# Patient Record
Sex: FEMALE | Race: WHITE | NOT HISPANIC OR LATINO | Employment: UNEMPLOYED | URBAN - METROPOLITAN AREA
[De-identification: names, ages, dates, MRNs, and addresses within clinical notes are randomized per-mention and may not be internally consistent; named-entity substitution may affect disease eponyms.]

---

## 2017-07-11 ENCOUNTER — OFFICE VISIT (OUTPATIENT)
Dept: CARDIOLOGY | Facility: CLINIC | Age: 57
End: 2017-07-11
Payer: COMMERCIAL

## 2017-07-11 VITALS
WEIGHT: 93.31 LBS | BODY MASS INDEX: 17.61 KG/M2 | HEART RATE: 72 BPM | SYSTOLIC BLOOD PRESSURE: 95 MMHG | HEIGHT: 61 IN | OXYGEN SATURATION: 99 % | DIASTOLIC BLOOD PRESSURE: 62 MMHG

## 2017-07-11 DIAGNOSIS — F41.9 ANXIETY: ICD-10-CM

## 2017-07-11 DIAGNOSIS — R06.09 DOE (DYSPNEA ON EXERTION): ICD-10-CM

## 2017-07-11 DIAGNOSIS — J44.9 CHRONIC OBSTRUCTIVE PULMONARY DISEASE, UNSPECIFIED COPD TYPE: ICD-10-CM

## 2017-07-11 DIAGNOSIS — R07.9 CHEST PAIN, UNSPECIFIED TYPE: ICD-10-CM

## 2017-07-11 DIAGNOSIS — R29.898 WEAKNESS OF BOTH LOWER EXTREMITIES: Primary | ICD-10-CM

## 2017-07-11 PROCEDURE — 99999 PR PBB SHADOW E&M-EST. PATIENT-LVL IV: CPT | Mod: PBBFAC,,, | Performed by: INTERNAL MEDICINE

## 2017-07-11 PROCEDURE — 99204 OFFICE O/P NEW MOD 45 MIN: CPT | Mod: S$GLB,,, | Performed by: INTERNAL MEDICINE

## 2017-07-11 RX ORDER — NITROGLYCERIN 0.4 MG/1
0.4 TABLET SUBLINGUAL EVERY 5 MIN PRN
COMMUNITY

## 2017-07-11 RX ORDER — CILOSTAZOL 50 MG/1
50 TABLET ORAL 2 TIMES DAILY
Qty: 60 TABLET | Refills: 11 | Status: SHIPPED | OUTPATIENT
Start: 2017-07-11 | End: 2017-10-06 | Stop reason: SDUPTHER

## 2017-07-11 RX ORDER — TIZANIDINE 4 MG/1
4 TABLET ORAL EVERY 6 HOURS PRN
Status: ON HOLD | COMMUNITY
End: 2017-08-26 | Stop reason: HOSPADM

## 2017-07-11 RX ORDER — MIRTAZAPINE 15 MG/1
15 TABLET, FILM COATED ORAL NIGHTLY
Status: ON HOLD | COMMUNITY
End: 2017-08-26 | Stop reason: HOSPADM

## 2017-07-11 RX ORDER — PRAVASTATIN SODIUM 20 MG/1
20 TABLET ORAL DAILY
Qty: 30 TABLET | Refills: 11 | Status: SHIPPED | OUTPATIENT
Start: 2017-07-11 | End: 2018-07-11

## 2017-07-11 RX ORDER — NAPROXEN SODIUM 220 MG/1
81 TABLET, FILM COATED ORAL DAILY
Refills: 0 | COMMUNITY
Start: 2017-07-11 | End: 2018-07-11

## 2017-07-11 NOTE — PROGRESS NOTES
"Subjective:    Patient ID:  Min Cerda is a 56 y.o. female who presents for evaluation of Extremity Weakness      HPI  55 y/o female with hx of COPD, active tobacco use, fam hx of early CAD who presents for evaluation for poor circulation. She states that she has been having bilateral LE weakness R>L for some time now which has worsened. She states her legs "give out" and get weak when walking about 10 steps. Also c/o bilateral calf pain when walking the same distance. Has had falls. Symptoms improve with rest. No non healing ulceration, however, lesions take a while to heal. Also has intermittent paresthesias of both feet. Has also been having intermittent substernal chest tightness without radiation and unrelated to exertion. Has needed nitro at times which improves pain. Has chronic EASLEY. Denies orthopnea, PND, syncope. Continues to smoke, but is trying to quit. Compliant with meds.     Review of Systems   Constitution: Positive for weakness. Negative for malaise/fatigue.   HENT: Negative for congestion and headaches.    Eyes: Negative for blurred vision.   Cardiovascular: Positive for claudication. Negative for chest pain, cyanosis, dyspnea on exertion, irregular heartbeat, leg swelling, near-syncope, orthopnea, palpitations, paroxysmal nocturnal dyspnea and syncope.   Respiratory: Negative for shortness of breath.    Endocrine: Negative for polyuria.   Hematologic/Lymphatic: Negative for bleeding problem.   Skin: Negative for itching and rash.   Musculoskeletal: Negative for joint swelling, muscle cramps and muscle weakness.   Gastrointestinal: Negative for abdominal pain, hematemesis, hematochezia, melena, nausea and vomiting.   Genitourinary: Negative for dysuria and hematuria.   Neurological: Negative for dizziness, focal weakness, light-headedness and loss of balance.   Psychiatric/Behavioral: Negative for depression. The patient is not nervous/anxious.         Objective:    Physical Exam "   Constitutional: She is oriented to person, place, and time. She appears well-developed and well-nourished.   HENT:   Head: Normocephalic and atraumatic.   Neck: Neck supple. No JVD present.   Cardiovascular: Normal rate, regular rhythm and normal heart sounds.    Pulses:       Carotid pulses are 2+ on the right side, and 2+ on the left side.       Radial pulses are 2+ on the right side, and 2+ on the left side.        Femoral pulses are 2+ on the right side, and 2+ on the left side.       Dorsalis pedis pulses are 2+ on the right side, and 2+ on the left side.        Posterior tibial pulses are 2+ on the right side, and 2+ on the left side.   Pulmonary/Chest: Effort normal and breath sounds normal.   Abdominal: Soft. Bowel sounds are normal.   Musculoskeletal: She exhibits no edema.   Neurological: She is alert and oriented to person, place, and time.   Skin: Skin is warm and dry.   Psychiatric: She has a normal mood and affect. Her behavior is normal. Thought content normal.         Assessment:       1. Weakness of both lower extremities    2. Chronic obstructive pulmonary disease, unspecified COPD type    3. Anxiety    4. EASLEY (dyspnea on exertion)    5. Chest pain, unspecified type      55 y/o female with hx and presentation as above. Has risk factors for CAD and will evaluate with noninvasive cardiac stress testing. Regarding LE weakness and pain, likely claudication from PAD and will start ASA/statin/cilostazol. Bilateral LE arterial doppler and discussed likely needing a peripheral angiogram after doppler findings.        Plan:       -Nuclear SPECT  -Arterial LE doppler  -f/u 3 months

## 2017-08-03 ENCOUNTER — HOSPITAL ENCOUNTER (OUTPATIENT)
Dept: RADIOLOGY | Facility: HOSPITAL | Age: 57
Discharge: HOME | End: 2017-08-03
Attending: INTERNAL MEDICINE
Payer: COMMERCIAL

## 2017-08-03 ENCOUNTER — HOSPITAL ENCOUNTER (OUTPATIENT)
Dept: CARDIOLOGY | Facility: HOSPITAL | Age: 57
Discharge: HOME | End: 2017-08-03
Attending: INTERNAL MEDICINE
Payer: COMMERCIAL

## 2017-08-03 DIAGNOSIS — R29.898 WEAKNESS OF BOTH LOWER EXTREMITIES: ICD-10-CM

## 2017-08-03 DIAGNOSIS — R07.9 CHEST PAIN, UNSPECIFIED TYPE: ICD-10-CM

## 2017-08-03 LAB — DIASTOLIC DYSFUNCTION: NO

## 2017-08-03 PROCEDURE — 93925 LOWER EXTREMITY STUDY: CPT | Mod: TC

## 2017-08-03 PROCEDURE — 78452 HT MUSCLE IMAGE SPECT MULT: CPT | Mod: TC

## 2017-08-03 PROCEDURE — 93016 CV STRESS TEST SUPVJ ONLY: CPT | Mod: ,,, | Performed by: INTERNAL MEDICINE

## 2017-08-03 PROCEDURE — 93018 CV STRESS TEST I&R ONLY: CPT | Mod: ,,, | Performed by: INTERNAL MEDICINE

## 2017-08-03 PROCEDURE — 93925 LOWER EXTREMITY STUDY: CPT | Mod: 26,,, | Performed by: RADIOLOGY

## 2017-08-03 PROCEDURE — 78452 HT MUSCLE IMAGE SPECT MULT: CPT | Mod: 26,,, | Performed by: RADIOLOGY

## 2017-08-04 ENCOUNTER — TELEPHONE (OUTPATIENT)
Dept: CARDIOLOGY | Facility: CLINIC | Age: 57
End: 2017-08-04

## 2017-08-04 DIAGNOSIS — I73.9 PAD (PERIPHERAL ARTERY DISEASE): Primary | ICD-10-CM

## 2017-08-11 ENCOUNTER — HOSPITAL ENCOUNTER (OUTPATIENT)
Dept: RADIOLOGY | Facility: HOSPITAL | Age: 57
Discharge: HOME | End: 2017-08-11
Attending: INTERNAL MEDICINE
Payer: COMMERCIAL

## 2017-08-11 ENCOUNTER — TELEPHONE (OUTPATIENT)
Dept: CARDIOLOGY | Facility: CLINIC | Age: 57
End: 2017-08-11

## 2017-08-11 DIAGNOSIS — I73.9 PAD (PERIPHERAL ARTERY DISEASE): ICD-10-CM

## 2017-08-11 NOTE — TELEPHONE ENCOUNTER
----- Message from Nii Foster MD sent at 8/11/2017  8:44 AM CDT -----  Contact: [phone]/ self   Doesn't work that way. Schedule her to see me in clinic  [place]    ----- Message -----  From: Nathaly Kelly MA  Sent: 8/10/2017   1:49 PM  To: Nii Foster MD        ----- Message -----  From: Melvi Rincon  Sent: 8/10/2017  11:10 AM  To: Cristian Bales Staff    Patient says she has been having to take her nitroglycerin 3 times a day instead of once a day. She would like to have a chest xray ordered so she can take it along with her CT scan. Please advise.

## 2017-08-18 ENCOUNTER — TELEPHONE (OUTPATIENT)
Dept: CARDIOLOGY | Facility: CLINIC | Age: 57
End: 2017-08-18

## 2017-08-18 NOTE — TELEPHONE ENCOUNTER
----- Message from Zayda Szymanski sent at 8/18/2017  3:13 PM CDT -----  Contact: [phone]/ self   Pt its requesting to schedule a procedure . Please advise

## 2017-08-22 ENCOUNTER — TELEPHONE (OUTPATIENT)
Dept: CARDIOLOGY | Facility: CLINIC | Age: 57
End: 2017-08-22

## 2017-08-22 RX ORDER — PREDNISONE 50 MG/1
50 TABLET ORAL
Qty: 3 TABLET | Refills: 0 | Status: SHIPPED | OUTPATIENT
Start: 2017-08-22 | End: 2017-08-22 | Stop reason: SDUPTHER

## 2017-08-22 RX ORDER — PREDNISONE 50 MG/1
50 TABLET ORAL
Qty: 3 TABLET | Refills: 0 | Status: SHIPPED | OUTPATIENT
Start: 2017-08-22 | End: 2017-09-01

## 2017-08-22 NOTE — TELEPHONE ENCOUNTER
----- Message from April Benoit sent at 8/22/2017  3:15 PM CDT -----  Contact: self  Pt is calling to speak with Dr Foster / His nurse concerning her being dizzy, can't stand up, short of breath and light headed. She said he aunt had these same symptoms before she ( the aunt ) had a massive heart attack.  Pt was advised to go to the nearest emergency room , pt then said she doesn't have a ride pt was told again to go to the emergency room because time is of the essence, I told to hang up and call 911 for a ride to the emergency room for treatment.

## 2017-08-25 ENCOUNTER — HOSPITAL ENCOUNTER (INPATIENT)
Facility: HOSPITAL | Age: 57
LOS: 1 days | Discharge: HOME | DRG: 683 | End: 2017-08-26
Attending: FAMILY MEDICINE | Admitting: INTERNAL MEDICINE
Payer: COMMERCIAL

## 2017-08-25 DIAGNOSIS — N17.9 ACUTE RENAL FAILURE, UNSPECIFIED ACUTE RENAL FAILURE TYPE: Primary | ICD-10-CM

## 2017-08-25 DIAGNOSIS — F32.A DEPRESSION, UNSPECIFIED DEPRESSION TYPE: ICD-10-CM

## 2017-08-25 DIAGNOSIS — I95.1 ORTHOSTATIC HYPOTENSION: ICD-10-CM

## 2017-08-25 DIAGNOSIS — R07.9 CHEST PAIN: ICD-10-CM

## 2017-08-25 DIAGNOSIS — E86.1 HYPOVOLEMIA: ICD-10-CM

## 2017-08-25 DIAGNOSIS — N17.9 ACUTE RENAL FAILURE: ICD-10-CM

## 2017-08-25 DIAGNOSIS — R63.4 RAPID WEIGHT LOSS: ICD-10-CM
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
## 2017-08-25 LAB
ALBUMIN SERPL BCP-MCNC: 3.8 G/DL
ALP SERPL-CCNC: 98 U/L
ALT SERPL W/O P-5'-P-CCNC: 23 U/L
ANION GAP SERPL CALC-SCNC: 10 MMOL/L
ANION GAP SERPL CALC-SCNC: 15 MMOL/L
AST SERPL-CCNC: 16 U/L
BASOPHILS # BLD AUTO: 0.04 K/UL
BASOPHILS NFR BLD: 0.5 %
BILIRUB SERPL-MCNC: 0.3 MG/DL
BILIRUB UR QL STRIP: NEGATIVE
BUN SERPL-MCNC: 53 MG/DL
BUN SERPL-MCNC: 62 MG/DL
CALCIUM SERPL-MCNC: 7.7 MG/DL
CALCIUM SERPL-MCNC: 8.7 MG/DL
CHLORIDE SERPL-SCNC: 108 MMOL/L
CHLORIDE SERPL-SCNC: 97 MMOL/L
CLARITY UR REFRACT.AUTO: ABNORMAL
CO2 SERPL-SCNC: 16 MMOL/L
CO2 SERPL-SCNC: 19 MMOL/L
COLOR UR AUTO: YELLOW
CREAT SERPL-MCNC: 2.3 MG/DL
CREAT SERPL-MCNC: 3.26 MG/DL
CREAT UR-MCNC: 32.3 MG/DL
DIFFERENTIAL METHOD: ABNORMAL
EOSINOPHIL # BLD AUTO: 0.2 K/UL
EOSINOPHIL NFR BLD: 2.3 %
ERYTHROCYTE [DISTWIDTH] IN BLOOD BY AUTOMATED COUNT: 14.6 %
EST. GFR  (AFRICAN AMERICAN): 17.3 ML/MIN/1.73 M^2
EST. GFR  (AFRICAN AMERICAN): 26 ML/MIN/1.73 M^2
EST. GFR  (NON AFRICAN AMERICAN): 15 ML/MIN/1.73 M^2
EST. GFR  (NON AFRICAN AMERICAN): 23 ML/MIN/1.73 M^2
FERRITIN SERPL-MCNC: 201 NG/ML
GLUCOSE SERPL-MCNC: 101 MG/DL
GLUCOSE SERPL-MCNC: 145 MG/DL
GLUCOSE UR QL STRIP: NEGATIVE
HCT VFR BLD AUTO: 33.8 %
HGB BLD-MCNC: 11.6 G/DL
HGB UR QL STRIP: ABNORMAL
IRON SERPL-MCNC: 46 UG/DL
KETONES UR QL STRIP: NEGATIVE
LEUKOCYTE ESTERASE UR QL STRIP: ABNORMAL
LYMPHOCYTES # BLD AUTO: 1.9 K/UL
LYMPHOCYTES NFR BLD: 21.6 %
MCH RBC QN AUTO: 32 PG
MCHC RBC AUTO-ENTMCNC: 34.3 G/DL
MCV RBC AUTO: 93 FL
MICROSCOPIC COMMENT: ABNORMAL
MONOCYTES # BLD AUTO: 0.5 K/UL
MONOCYTES NFR BLD: 6 %
NEUTROPHILS # BLD AUTO: 6 K/UL
NEUTROPHILS NFR BLD: 69.4 %
NITRITE UR QL STRIP: NEGATIVE
NT-PROBNP: 34 PG/ML
OB PNL STL: NEGATIVE
PH UR STRIP: 5 [PH] (ref 5–8)
PLATELET # BLD AUTO: 244 K/UL
PMV BLD AUTO: 10.5 FL
POTASSIUM SERPL-SCNC: 4.2 MMOL/L
POTASSIUM SERPL-SCNC: 4.3 MMOL/L
POTASSIUM UR-SCNC: <10 MMOL/L
PROT SERPL-MCNC: 6.4 G/DL
PROT UR QL STRIP: NEGATIVE
RBC # BLD AUTO: 3.62 M/UL
RBC #/AREA URNS AUTO: 4 /HPF (ref 0–4)
SATURATED IRON: 20 %
SODIUM SERPL-SCNC: 131 MMOL/L
SODIUM SERPL-SCNC: 134 MMOL/L
SODIUM UR-SCNC: 41 MMOL/L
SP GR UR STRIP: 1.01 (ref 1–1.03)
TOTAL IRON BINDING CAPACITY: 229 UG/DL
TRANSFERRIN SERPL-MCNC: 155 MG/DL
TRANSFERRIN SERPL-MCNC: 155 MG/DL
TROPONIN I SERPL DL<=0.01 NG/ML-MCNC: <0.006 NG/ML
TROPONIN I SERPL DL<=0.01 NG/ML-MCNC: <0.006 NG/ML
TROPONIN I SERPL DL<=0.01 NG/ML-MCNC: <0.012 NG/ML
URN SPEC COLLECT METH UR: ABNORMAL
UROBILINOGEN UR STRIP-ACNC: NEGATIVE EU/DL
WBC # BLD AUTO: 8.63 K/UL
WBC #/AREA URNS AUTO: 25 /HPF (ref 0–5)

## 2017-08-25 PROCEDURE — 25000003 PHARM REV CODE 250: Performed by: STUDENT IN AN ORGANIZED HEALTH CARE EDUCATION/TRAINING PROGRAM

## 2017-08-25 PROCEDURE — 87186 SC STD MICRODIL/AGAR DIL: CPT

## 2017-08-25 PROCEDURE — 81000 URINALYSIS NONAUTO W/SCOPE: CPT

## 2017-08-25 PROCEDURE — 99285 EMERGENCY DEPT VISIT HI MDM: CPT | Mod: 25

## 2017-08-25 PROCEDURE — 25000003 PHARM REV CODE 250: Performed by: FAMILY MEDICINE

## 2017-08-25 PROCEDURE — 82607 VITAMIN B-12: CPT

## 2017-08-25 PROCEDURE — 82272 OCCULT BLD FECES 1-3 TESTS: CPT

## 2017-08-25 PROCEDURE — 36415 COLL VENOUS BLD VENIPUNCTURE: CPT

## 2017-08-25 PROCEDURE — 82728 ASSAY OF FERRITIN: CPT

## 2017-08-25 PROCEDURE — 96361 HYDRATE IV INFUSION ADD-ON: CPT

## 2017-08-25 PROCEDURE — 87086 URINE CULTURE/COLONY COUNT: CPT

## 2017-08-25 PROCEDURE — 84484 ASSAY OF TROPONIN QUANT: CPT | Mod: 91

## 2017-08-25 PROCEDURE — 80048 BASIC METABOLIC PNL TOTAL CA: CPT

## 2017-08-25 PROCEDURE — 94761 N-INVAS EAR/PLS OXIMETRY MLT: CPT

## 2017-08-25 PROCEDURE — 82746 ASSAY OF FOLIC ACID SERUM: CPT

## 2017-08-25 PROCEDURE — 85025 COMPLETE CBC W/AUTO DIFF WBC: CPT

## 2017-08-25 PROCEDURE — 83540 ASSAY OF IRON: CPT

## 2017-08-25 PROCEDURE — 84300 ASSAY OF URINE SODIUM: CPT

## 2017-08-25 PROCEDURE — 93010 ELECTROCARDIOGRAM REPORT: CPT | Mod: ,,, | Performed by: INTERNAL MEDICINE

## 2017-08-25 PROCEDURE — 80053 COMPREHEN METABOLIC PANEL: CPT

## 2017-08-25 PROCEDURE — 93005 ELECTROCARDIOGRAM TRACING: CPT

## 2017-08-25 PROCEDURE — 87088 URINE BACTERIA CULTURE: CPT

## 2017-08-25 PROCEDURE — 84133 ASSAY OF URINE POTASSIUM: CPT

## 2017-08-25 PROCEDURE — 25000003 PHARM REV CODE 250: Performed by: INTERNAL MEDICINE

## 2017-08-25 PROCEDURE — 96360 HYDRATION IV INFUSION INIT: CPT

## 2017-08-25 PROCEDURE — 82570 ASSAY OF URINE CREATININE: CPT

## 2017-08-25 PROCEDURE — 83880 ASSAY OF NATRIURETIC PEPTIDE: CPT

## 2017-08-25 PROCEDURE — 87077 CULTURE AEROBIC IDENTIFY: CPT

## 2017-08-25 PROCEDURE — 11000001 HC ACUTE MED/SURG PRIVATE ROOM

## 2017-08-25 RX ORDER — METOPROLOL SUCCINATE 25 MG/1
25 TABLET, EXTENDED RELEASE ORAL DAILY
COMMUNITY
End: 2017-12-08

## 2017-08-25 RX ORDER — DIAZEPAM 5 MG/1
5 TABLET ORAL EVERY 12 HOURS PRN
Status: DISCONTINUED | OUTPATIENT
Start: 2017-08-25 | End: 2017-08-26 | Stop reason: HOSPADM

## 2017-08-25 RX ORDER — CILOSTAZOL 50 MG/1
50 TABLET ORAL 2 TIMES DAILY
Status: DISCONTINUED | OUTPATIENT
Start: 2017-08-25 | End: 2017-08-26 | Stop reason: HOSPADM

## 2017-08-25 RX ORDER — DOXEPIN HYDROCHLORIDE 25 MG/1
100 CAPSULE ORAL NIGHTLY
Status: DISCONTINUED | OUTPATIENT
Start: 2017-08-26 | End: 2017-08-26

## 2017-08-25 RX ORDER — PRAVASTATIN SODIUM 10 MG/1
20 TABLET ORAL DAILY
Status: DISCONTINUED | OUTPATIENT
Start: 2017-08-26 | End: 2017-08-26 | Stop reason: HOSPADM

## 2017-08-25 RX ORDER — METOPROLOL TARTRATE 25 MG/1
25 TABLET, FILM COATED ORAL 2 TIMES DAILY
Status: DISCONTINUED | OUTPATIENT
Start: 2017-08-25 | End: 2017-08-26 | Stop reason: HOSPADM

## 2017-08-25 RX ORDER — SODIUM CHLORIDE 9 MG/ML
1000 INJECTION, SOLUTION INTRAVENOUS
Status: COMPLETED | OUTPATIENT
Start: 2017-08-25 | End: 2017-08-25

## 2017-08-25 RX ORDER — HYDROCODONE BITARTRATE AND ACETAMINOPHEN 5; 325 MG/1; MG/1
2 TABLET ORAL EVERY 8 HOURS PRN
Status: DISCONTINUED | OUTPATIENT
Start: 2017-08-25 | End: 2017-08-26 | Stop reason: HOSPADM

## 2017-08-25 RX ORDER — SODIUM CHLORIDE 9 MG/ML
INJECTION, SOLUTION INTRAVENOUS CONTINUOUS
Status: DISCONTINUED | OUTPATIENT
Start: 2017-08-25 | End: 2017-08-25

## 2017-08-25 RX ORDER — NAPROXEN SODIUM 220 MG/1
81 TABLET, FILM COATED ORAL DAILY
Status: DISCONTINUED | OUTPATIENT
Start: 2017-08-26 | End: 2017-08-26 | Stop reason: HOSPADM

## 2017-08-25 RX ORDER — PROMETHAZINE HYDROCHLORIDE 12.5 MG/1
12.5 TABLET ORAL ONCE
Status: COMPLETED | OUTPATIENT
Start: 2017-08-26 | End: 2017-08-26

## 2017-08-25 RX ORDER — SODIUM CHLORIDE, SODIUM LACTATE, POTASSIUM CHLORIDE, CALCIUM CHLORIDE 600; 310; 30; 20 MG/100ML; MG/100ML; MG/100ML; MG/100ML
INJECTION, SOLUTION INTRAVENOUS CONTINUOUS
Status: DISCONTINUED | OUTPATIENT
Start: 2017-08-26 | End: 2017-08-26 | Stop reason: HOSPADM

## 2017-08-25 RX ADMIN — SODIUM CHLORIDE 1000 ML: 0.9 INJECTION, SOLUTION INTRAVENOUS at 02:08

## 2017-08-25 RX ADMIN — SODIUM CHLORIDE: 0.9 INJECTION, SOLUTION INTRAVENOUS at 04:08

## 2017-08-25 RX ADMIN — SODIUM CHLORIDE 1000 ML: 0.9 INJECTION, SOLUTION INTRAVENOUS at 12:08

## 2017-08-25 RX ADMIN — DIAZEPAM 5 MG: 5 TABLET ORAL at 10:08

## 2017-08-25 RX ADMIN — CILOSTAZOL 50 MG: 50 TABLET ORAL at 10:08

## 2017-08-25 RX ADMIN — METOPROLOL TARTRATE 25 MG: 25 TABLET ORAL at 10:08

## 2017-08-25 RX ADMIN — SODIUM CHLORIDE: 0.9 INJECTION, SOLUTION INTRAVENOUS at 10:08

## 2017-08-25 RX ADMIN — HYDROCODONE BITARTRATE AND ACETAMINOPHEN 2 TABLET: 5; 325 TABLET ORAL at 10:08

## 2017-08-25 NOTE — ED PROVIDER NOTES
Encounter Date: 2017       History     Chief Complaint   Patient presents with    Fall     I have been falling over the last couple weeks and my knees giving out and the top my body feels weak. Dr Wright sent me here because i lost 20 lbs over one month     56-year-old female presents with chief complaint of multiple falls and 20 pound weight loss over the last 2-3 months.  Patient does believe or weight loss been related to decreased appetite which she believes is related to the loss of her mother recently.  Patient states does have a history of COPD as well as anxiety.  Does complain of intermittent chest pain worse whenever she goes to lay down.  Denies any shortness of breath.  Denies any black or tarry stools.  Denies any bright red stools.  Patient does state has had colonoscopy in the past and was scheduled to have one in  did not.          Review of patient's allergies indicates:   Allergen Reactions    Crestor [rosuvastatin] Other (See Comments)     Causes tachycardia per patient    Iodine and iodide containing products     Tramadol     Sulfa (sulfonamide antibiotics) Palpitations     Past Medical History:   Diagnosis Date    Anxiety     Bipolar affective     COPD (chronic obstructive pulmonary disease)     Neck pain      Past Surgical History:   Procedure Laterality Date    APPENDECTOMY      CARPAL TUNNEL RELEASE       SECTION      HYSTERECTOMY      OVARIAN CYST REMOVAL      TONSILLECTOMY       History reviewed. No pertinent family history.  Social History   Substance Use Topics    Smoking status: Current Every Day Smoker     Packs/day: 1.00     Types: Cigarettes    Smokeless tobacco: Never Used    Alcohol use Not on file     Review of Systems   Respiratory: Negative for shortness of breath.    Cardiovascular: Positive for chest pain.   Musculoskeletal: Positive for back pain. Negative for neck pain.   All other systems reviewed and are negative.      Physical Exam      Initial Vitals [08/25/17 1147]   BP Pulse Resp Temp SpO2   (!) 89/51 68 15 96.6 °F (35.9 °C) 97 %      MAP       63.67         Physical Exam    Nursing note and vitals reviewed.  Constitutional: She appears well-developed and well-nourished.   HENT:   Head: Normocephalic and atraumatic.   Eyes: EOM are normal. Pupils are equal, round, and reactive to light.   Neck: Neck supple.   Cardiovascular: Normal rate, regular rhythm and normal heart sounds.   Pulmonary/Chest: Breath sounds normal.   Abdominal: Soft.   Musculoskeletal: Normal range of motion.        Right shoulder: She exhibits tenderness.        Arms:  Neurological: She is alert and oriented to person, place, and time.   Skin: Skin is warm. Capillary refill takes less than 2 seconds.   Psychiatric: She has a normal mood and affect. Her behavior is normal.         ED Course   Procedures  Labs Reviewed   CBC W/ AUTO DIFFERENTIAL - Abnormal; Notable for the following:        Result Value    RBC 3.62 (*)     Hemoglobin 11.6 (*)     Hematocrit 33.8 (*)     MCH 32.0 (*)     RDW 14.6 (*)     All other components within normal limits   OCCULT BLOOD X 1, STOOL   COMPREHENSIVE METABOLIC PANEL   TROPONIN I   NT-PRO NATRIURETIC PEPTIDE                               ED Course     Clinical Impression:   The primary encounter diagnosis was Acute renal failure, unspecified acute renal failure type. Diagnoses of Chest pain, Fall, Orthostatic hypotension, Hypovolemia, Acute renal failure, Depression, unspecified depression type, and Rapid weight loss were also pertinent to this visit.                           Will Fairchild MD  09/07/17 6117

## 2017-08-25 NOTE — PLAN OF CARE
Pt arrived via stretcher from [facility]. Pt AAOx3. Ambulated to bed with assistance. IV fluids at 100cc/hr. Up with assistance to bedside commode. Pt complains of generalized pain. Verbalized understanding of plan of care. Fall risk and allergy band on. Yellow socks on. Bed alarm set, bed in lowest position, call bell in reach. Will continue to monitor.

## 2017-08-25 NOTE — ED NOTES
"PT resting comfortably on stretcher AA&O X's 3.  Resp even and unlabored.  Skin warm and dry.  PT appears pale.  PT seems weak and slow to answer questions.  PT c/o weakness, weight loss, and N/V for the past 2 months.  PT has lost 20lbs in the past 2 months.  PT c/o frequent falls as well.  Pt fell yesterday.  PT with abrasion to RT wrist noted.  PT denies any LOC.  PT states, "my mom and son just  recently."  "I just thought I was depressed."   PT c/o decreased urine output.  PT c/o constipation frequently.    "

## 2017-08-26 VITALS
BODY MASS INDEX: 16.98 KG/M2 | HEIGHT: 61 IN | DIASTOLIC BLOOD PRESSURE: 58 MMHG | WEIGHT: 89.94 LBS | HEART RATE: 71 BPM | RESPIRATION RATE: 18 BRPM | OXYGEN SATURATION: 99 % | SYSTOLIC BLOOD PRESSURE: 101 MMHG | TEMPERATURE: 98 F

## 2017-08-26 LAB
ANION GAP SERPL CALC-SCNC: 7 MMOL/L
BASOPHILS # BLD AUTO: 0.02 K/UL
BASOPHILS NFR BLD: 0.2 %
BUN SERPL-MCNC: 33 MG/DL
CALCIUM SERPL-MCNC: 8.2 MG/DL
CHLORIDE SERPL-SCNC: 109 MMOL/L
CO2 SERPL-SCNC: 21 MMOL/L
CREAT SERPL-MCNC: 1.2 MG/DL
DIFFERENTIAL METHOD: ABNORMAL
EOSINOPHIL # BLD AUTO: 0.3 K/UL
EOSINOPHIL NFR BLD: 2.9 %
ERYTHROCYTE [DISTWIDTH] IN BLOOD BY AUTOMATED COUNT: 14.8 %
EST. GFR  (AFRICAN AMERICAN): 58 ML/MIN/1.73 M^2
EST. GFR  (NON AFRICAN AMERICAN): 50 ML/MIN/1.73 M^2
FOLATE SERPL-MCNC: 16.9 NG/ML
GLUCOSE SERPL-MCNC: 87 MG/DL
HCT VFR BLD AUTO: 28.9 %
HGB BLD-MCNC: 9.8 G/DL
LYMPHOCYTES # BLD AUTO: 3.9 K/UL
LYMPHOCYTES NFR BLD: 41.6 %
MCH RBC QN AUTO: 31.4 PG
MCHC RBC AUTO-ENTMCNC: 33.9 G/DL
MCV RBC AUTO: 93 FL
MONOCYTES # BLD AUTO: 0.6 K/UL
MONOCYTES NFR BLD: 6.3 %
NEUTROPHILS # BLD AUTO: 4.6 K/UL
NEUTROPHILS NFR BLD: 48.8 %
PLATELET # BLD AUTO: 213 K/UL
PMV BLD AUTO: 10.7 FL
POTASSIUM SERPL-SCNC: 4.4 MMOL/L
RBC # BLD AUTO: 3.12 M/UL
SODIUM SERPL-SCNC: 137 MMOL/L
TROPONIN I SERPL DL<=0.01 NG/ML-MCNC: <0.006 NG/ML
TSH SERPL DL<=0.005 MIU/L-ACNC: 2.1 UIU/ML
VIT B12 SERPL-MCNC: 433 PG/ML
WBC # BLD AUTO: 9.4 K/UL

## 2017-08-26 PROCEDURE — 84443 ASSAY THYROID STIM HORMONE: CPT

## 2017-08-26 PROCEDURE — 27000221 HC OXYGEN, UP TO 24 HOURS

## 2017-08-26 PROCEDURE — 25000003 PHARM REV CODE 250: Performed by: PSYCHIATRY & NEUROLOGY

## 2017-08-26 PROCEDURE — 25000003 PHARM REV CODE 250: Performed by: INTERNAL MEDICINE

## 2017-08-26 PROCEDURE — 93005 ELECTROCARDIOGRAM TRACING: CPT

## 2017-08-26 PROCEDURE — 84484 ASSAY OF TROPONIN QUANT: CPT

## 2017-08-26 PROCEDURE — 36415 COLL VENOUS BLD VENIPUNCTURE: CPT

## 2017-08-26 PROCEDURE — 80048 BASIC METABOLIC PNL TOTAL CA: CPT

## 2017-08-26 PROCEDURE — 25000003 PHARM REV CODE 250: Performed by: STUDENT IN AN ORGANIZED HEALTH CARE EDUCATION/TRAINING PROGRAM

## 2017-08-26 PROCEDURE — 94761 N-INVAS EAR/PLS OXIMETRY MLT: CPT

## 2017-08-26 PROCEDURE — 85025 COMPLETE CBC W/AUTO DIFF WBC: CPT

## 2017-08-26 RX ORDER — ESCITALOPRAM OXALATE 5 MG/1
5 TABLET ORAL DAILY
Status: DISCONTINUED | OUTPATIENT
Start: 2017-08-26 | End: 2017-08-26 | Stop reason: HOSPADM

## 2017-08-26 RX ORDER — DOXEPIN HYDROCHLORIDE 25 MG/1
100 CAPSULE ORAL NIGHTLY
Status: DISCONTINUED | OUTPATIENT
Start: 2017-08-26 | End: 2017-08-26 | Stop reason: HOSPADM

## 2017-08-26 RX ORDER — ESCITALOPRAM OXALATE 10 MG/1
10 TABLET ORAL DAILY
Qty: 30 TABLET | Refills: 1 | Status: SHIPPED | OUTPATIENT
Start: 2017-08-28 | End: 2017-10-06

## 2017-08-26 RX ORDER — ESCITALOPRAM OXALATE 10 MG/1
10 TABLET ORAL DAILY
Status: DISCONTINUED | OUTPATIENT
Start: 2017-08-28 | End: 2017-08-26 | Stop reason: HOSPADM

## 2017-08-26 RX ORDER — DOXEPIN HYDROCHLORIDE 100 MG/1
100 CAPSULE ORAL NIGHTLY
Start: 2017-08-26 | End: 2018-02-09

## 2017-08-26 RX ORDER — NITROGLYCERIN 0.4 MG/1
0.4 TABLET SUBLINGUAL EVERY 5 MIN PRN
Status: DISCONTINUED | OUTPATIENT
Start: 2017-08-26 | End: 2017-08-26 | Stop reason: HOSPADM

## 2017-08-26 RX ORDER — DIAZEPAM 5 MG/1
5 TABLET ORAL EVERY 12 HOURS PRN
Start: 2017-08-26

## 2017-08-26 RX ORDER — METOPROLOL TARTRATE 25 MG/1
25 TABLET, FILM COATED ORAL 2 TIMES DAILY
Qty: 60 TABLET | Refills: 1 | Status: CANCELLED | OUTPATIENT
Start: 2017-08-26 | End: 2018-08-26

## 2017-08-26 RX ADMIN — DIAZEPAM 5 MG: 5 TABLET ORAL at 10:08

## 2017-08-26 RX ADMIN — NITROGLYCERIN 0.4 MG: 0.4 TABLET SUBLINGUAL at 03:08

## 2017-08-26 RX ADMIN — SODIUM CHLORIDE, SODIUM LACTATE, POTASSIUM CHLORIDE, AND CALCIUM CHLORIDE: .6; .31; .03; .02 INJECTION, SOLUTION INTRAVENOUS at 08:08

## 2017-08-26 RX ADMIN — HYDROCODONE BITARTRATE AND ACETAMINOPHEN 2 TABLET: 5; 325 TABLET ORAL at 10:08

## 2017-08-26 RX ADMIN — CILOSTAZOL 50 MG: 50 TABLET ORAL at 08:08

## 2017-08-26 RX ADMIN — ASPIRIN 81 MG 81 MG: 81 TABLET ORAL at 08:08

## 2017-08-26 RX ADMIN — DOXEPIN HYDROCHLORIDE 100 MG: 25 CAPSULE ORAL at 12:08

## 2017-08-26 RX ADMIN — SODIUM CHLORIDE, SODIUM LACTATE, POTASSIUM CHLORIDE, AND CALCIUM CHLORIDE: .6; .31; .03; .02 INJECTION, SOLUTION INTRAVENOUS at 12:08

## 2017-08-26 RX ADMIN — PRAVASTATIN SODIUM 20 MG: 10 TABLET ORAL at 08:08

## 2017-08-26 RX ADMIN — PROMETHAZINE HYDROCHLORIDE 12.5 MG: 12.5 TABLET ORAL at 12:08

## 2017-08-26 RX ADMIN — ESCITALOPRAM OXALATE 5 MG: 5 TABLET ORAL at 11:08

## 2017-08-26 RX ADMIN — METOPROLOL TARTRATE 25 MG: 25 TABLET ORAL at 08:08

## 2017-08-26 NOTE — H&P
[facility] Internal Medicine H&P    Admitting Team: Hospital Medicine Team B  Attending Physician: Misty  Resident: Pari  Intern: Taco    Date of Admit: 2017    Chief Complaint     Weakness, decreased PO intake, nausea, depression, falls x 4 weeks    Subjective:      History of Present Illness:  Min Cerda is a 56 y.o. female with anxiety, depression, COPD, CAD, PAD who presents with four week history of poor PO intake, depressive symptoms, persistent nausea, and weakness associated with frequent falls.    The patient was in their usual state of health until four weeks ago when she began to experience depressive symptoms secondary to her mother's death and son's poor health. She began to eat less and less until where she was hardly eating at all. She reports that anytime she does eat she begins to experience nausea. She also reports that she has stopped drinking liquid as well. She has lost 20 lbs.  She reports a feeling of weakness in her muscles and that she frequently feels like she has lost her equilibrium and has had frequent falls.     She additionally reports altered sensation in her feet bilaterally and increased pain and swelling in her R foot. She also reports that they sometimes feel cold.     She denies cough, chest pain or SOB but continues to smoke. She denies fevers or chills.        Past Medical History:  Past Medical History:   Diagnosis Date    Anxiety     Bipolar affective     COPD (chronic obstructive pulmonary disease)     Neck pain        Past Surgical History:  Past Surgical History:   Procedure Laterality Date    APPENDECTOMY      CARPAL TUNNEL RELEASE       SECTION      HYSTERECTOMY      OVARIAN CYST REMOVAL      TONSILLECTOMY         Allergies:  Review of patient's allergies indicates:   Allergen Reactions    Crestor [rosuvastatin] Other (See Comments)     Causes tachycardia per patient    Iodine and iodide containing products     Tramadol     Sulfa  (sulfonamide antibiotics) Palpitations       Home Medications:  Prior to Admission medications    Medication Sig Start Date End Date Taking? Authorizing Provider   cilostazol (PLETAL) 50 MG Tab Take 1 tablet (50 mg total) by mouth 2 (two) times daily. 7/11/17 7/11/18 Yes Nii Foster MD   diazepam (VALIUM) 10 MG Tab Take 10 mg by mouth 3 (three) times daily.   Yes Historical Provider, MD   doxepin (SINEQUAN) 150 MG Cap Take 150 mg by mouth every evening.   Yes Historical Provider, MD   hydrocodone-acetaminophen 10-325mg (NORCO)  mg Tab Take by mouth every 4 to 6 hours as needed.   Yes Historical Provider, MD   meloxicam (MOBIC) 7.5 MG tablet Take 7.5 mg by mouth 2 (two) times daily.   Yes Historical Provider, MD   metoprolol succinate (TOPROL-XL) 25 MG 24 hr tablet Take 25 mg by mouth once daily.   Yes Historical Provider, MD   pravastatin (PRAVACHOL) 20 MG tablet Take 1 tablet (20 mg total) by mouth once daily. 7/11/17 7/11/18 Yes Nii Foster MD   albuterol (PROAIR HFA) 90 mcg/actuation inhaler Inhale 2 puffs into the lungs every 6 (six) hours as needed for Wheezing.    Historical Provider, MD   aspirin 81 MG Chew Take 1 tablet (81 mg total) by mouth once daily. 7/11/17 7/11/18  Nii Foster MD   cyproheptadine (PERIACTIN) 4 mg tablet Take 4 mg by mouth once daily.    Historical Provider, MD   fluticasone-salmeterol 250-50 mcg/dose (ADVAIR) 250-50 mcg/dose diskus inhaler Inhale 1 puff into the lungs 2 (two) times daily.    Historical Provider, MD   folic acid (FOLVITE) 1 MG tablet Take 1 mg by mouth once daily.    Historical Provider, MD   gabapentin (NEURONTIN) 300 MG capsule Take 300 mg by mouth 3 (three) times daily.    Historical Provider, MD   linaclotide (LINZESS) 290 mcg Cap Take 290 mcg by mouth once daily.    Historical Provider, MD   mirtazapine (REMERON) 15 MG tablet Take 15 mg by mouth every evening.    Historical Provider, MD   nitroGLYCERIN (NITROSTAT) 0.4 MG SL tablet Place 0.4  "mg under the tongue every 5 (five) minutes as needed for Chest pain.    Historical Provider, MD   predniSONE (DELTASONE) 50 MG Tab Take 1 tablet (50 mg total) by mouth as needed (for procedure). Take 1 tab 13 hours before, 7 hours before, 1 hour before procedure 17  Nii Foster MD   promethazine (PHENERGAN) 25 MG tablet Take 25 mg by mouth 3 (three) times daily as needed for Nausea.    Historical Provider, MD   tizanidine (ZANAFLEX) 4 MG tablet Take 4 mg by mouth every 6 (six) hours as needed.    Historical Provider, MD   topiramate (TOPAMAX) 50 MG tablet Take 100 mg by mouth 2 (two) times daily.    Historical Provider, MD       Family History:  History reviewed. No pertinent family history.    Social History:  Social History   Substance Use Topics    Smoking status: Current Every Day Smoker     Packs/day: 1.00     Types: Cigarettes    Smokeless tobacco: Never Used    Alcohol use Not on file       Review of Systems:  Pertinent items are noted in HPI. All other systems are reviewed and are negative.    Health Maintaince :   Primary Care Physician: Adriana    Immunizations:   TDap unknown  Flu unknown  Pna unknown    Cancer Screening:  PAP: unknown  MMG unknown  Colonoscopy unknown     Objective:   Last 24 Hour Vital Signs:  BP  Min: 70/45  Max: 124/58  Temp  Av.9 °F (36.1 °C)  Min: 96.2 °F (35.7 °C)  Max: 97.6 °F (36.4 °C)  Pulse  Av.4  Min: 66  Max: 87  Resp  Av.7  Min: 15  Max: 27  SpO2  Av.4 %  Min: 95 %  Max: 100 %  Height  Av' 1" (154.9 cm)  Min: 5' 1" (154.9 cm)  Max: 5' 1" (154.9 cm)  Weight  Av.8 kg (89 lb 14.8 oz)  Min: 40.8 kg (89 lb 14.4 oz)  Max: 40.8 kg (89 lb 15.2 oz)  Body mass index is 17 kg/m².  I/O last 3 completed shifts:  In: -   Out: 850 [Urine:850]    Physical Examination:  Gen: thin, withdrawn, cooperative  HEENT: PERRL, EOMi, dry mucous membranes  CVS: regular rate, regular rhythm, no murmur heard   Resp: no use of accessory muscles, clear " to auscultation bilaterally, no wheezes or crackles  Abdominal: soft, non-tender, non-distended, no guarding or rebound   Neuro: alert and oriented x 3, moving all extremities  Extremities: 1+ DPs bilaterally, increased swelling R foot  Psych: flat affect, dysphoric mood    Laboratory:  Most Recent Data:  CBC: Lab Results   Component Value Date    WBC 8.63 08/25/2017    HGB 11.6 (L) 08/25/2017    HCT 33.8 (L) 08/25/2017     08/25/2017    MCV 93 08/25/2017    RDW 14.6 (H) 08/25/2017       BMP: Lab Results   Component Value Date     (L) 08/25/2017    K 4.3 08/25/2017    CL 97 08/25/2017    CO2 19 (L) 08/25/2017    BUN 62 (H) 08/25/2017    CREATININE 3.26 (H) 08/25/2017     (H) 08/25/2017    CALCIUM 8.7 08/25/2017     LFTs: Lab Results   Component Value Date    PROT 6.4 08/25/2017    ALBUMIN 3.8 08/25/2017    BILITOT 0.3 08/25/2017    AST 16 08/25/2017    ALKPHOS 98 08/25/2017    ALT 23 08/25/2017     Coags: No results found for: INR, PROTIME, PTT  FLP: No results found for: CHOL, HDL, LDLCALC, TRIG, CHOLHDL  DM: Lab Results   Component Value Date    CREATININE 3.26 (H) 08/25/2017     Thyroid: No results found for: TSH, FREET4, V8WJJEN, S6VJPBB, THYROIDAB  Anemia: No results found for: IRON, TIBC, FERRITIN, ESHWHZIT08, FOLATE  Cardiac: Lab Results   Component Value Date    TROPONINI <0.006 08/25/2017     Urinalysis: Lab Results   Component Value Date    COLORU Yellow 08/25/2017    SPECGRAV 1.010 08/25/2017    NITRITE Negative 08/25/2017    KETONESU Negative 08/25/2017    UROBILINOGEN Negative 08/25/2017    WBCUA 25 (H) 08/25/2017       Trended Lab Data:    Recent Labs  Lab 08/25/17  1200   WBC 8.63   HGB 11.6*   HCT 33.8*      MCV 93   RDW 14.6*   *   K 4.3   CL 97   CO2 19*   BUN 62*   CREATININE 3.26*   *   PROT 6.4   ALBUMIN 3.8   BILITOT 0.3   AST 16   ALKPHOS 98   ALT 23       Trended Cardiac Data:    Recent Labs  Lab 08/25/17  1200 08/25/17  1551 08/25/17  1808   TROPONINI  <0.012 <0.006 <0.006       Microbiology Data:      Other Results:  EKG (my interpretation):   Normal sinus   Intervals wnl  No evidence of acute ischemia    Radiology:  Imaging Results          US Retroperitoneal Complete (Kidney and (Final result)  Result time 08/25/17 20:17:12    Final result by Ricci Fang MD (08/25/17 20:17:12)                 Impression:       1.  Moderate right-sided pelvocaliectasis with increased resistive index of the right kidney.  No obstructing stone is identified.  Short-term followup is suggested.     2.  Normal appearance of the left kidney.    3.  Nonspecific distention of the urinary bladder.      Electronically signed by: RICCI FANG MD  Date:     08/25/17  Time:    20:17              Narrative:    Exam: [id number]  08/25/17  19:30:39 [id number] ([facility]) : US RETROPERITONEAL COMPLETE    Technique:    Complete retroperitoneal ultrasound was performed.    Comparison:     None     Findings:      The right kidney measures 10.9 x 4.8 x 4.3 cm.  The corticomedullary differentiation is maintained.  There is moderate right sided pelvocaliectasis.  No stones are identified.  There is normal perfusion to the right kidney.  The right resistive index is increased measuring 0.82.    The left kidney measures 9.2 x 3.6 x 4.4 cm.  The corticomedullary differentiation is maintained.  There is no evidence of left-sided hydroureteronephrosis.  There is normal perfusion of the left kidney.  The left resistive index is within normal limits measuring 0.6    The urinary bladder is distended.                             X-Ray Chest 1 View (Final result)  Result time 08/25/17 12:41:22    Final result by JASON Bundy Sr., MD (08/25/17 12:41:22)                 Impression:        1. The lungs are clear.   2. There are surgical clips projected over the right upper quadrant of the abdomen. This is characteristic of a prior cholecystectomy.        Electronically signed by: JASON BUNDY MD  Date:      08/25/17  Time:    12:41              Narrative:    One-view chest x-ray    Clinical History: Unspecified fall, initial encounter    Finding: Comparison was made to a prior examination performed on 7/15/2016. The size of the heart is normal. The lungs are clear. There is no pneumothorax.  The costophrenic angles are sharp. There are surgical clips projected over the right upper quadrant of the abdomen.                                 Assessment:     Min Cerda is a 56 y.o. female with:  Patient Active Problem List    Diagnosis Date Noted    Acute renal failure 08/25/2017    COPD (chronic obstructive pulmonary disease) 07/11/2017    Anxiety 07/11/2017    Weakness of both lower extremities 07/11/2017    EASLEY (dyspnea on exertion) 07/11/2017        Plan:     ERICKSON  - creatinine is 3.26 and BUN is 62 at admission  - likely secondary to decreased PO intake but could be multifactorial   - will continue IV fluids and trend   - will hold nephrotoxic medications  - patient takes Mobic regularly--holding   - possibility of urinary retention  - will order renal US to check for SABRINA    Hypotension  - presented with systolic pressure in 80s/90s with lowest systolic reading at 70  - responded to fluid boluses with pressure improving to over 100 systolic    Depression  - signs and symptoms of severe depression  - suspect to be primary  of this presentation  - will consult psych    Weight loss/nutrition  - most likely secondary to poor PO intake  - little concern for more sinister cause at this point  - patient ate dinner and had no complaints     Hyponatremia, asymptomatic  - mild at 131  - patient is hypovolemic on exam  - will get urine NA  - will continue IV fluids (NS) in setting of decreased PO intake and hypotension     Anxiety  - endorses baseline anxiety that is controlled with daily valium  - will 5mg valium BID and encourage an alternative strategy for anxiety management after discharge    Peripheral Artery  Disease  - pulses are faint in distal extremities  - has seen Dr. Foster in the past  - will monitor for signs of acute ischemia  - will consult Dr. Foster   - continue statin    Falls   - likely secondary to hypotension  - not associated with LOC so less likely syncopal episodes  - not associated with bowel or bladder incontinence   - unlikely to be neurological cause    COPD   - no evidence of acute exacerbation   - no increased cough or SOB  - will offer support for smoking cessation    CAD  - strong family history  - ECHO 7/15 with normal EF   - NM Study Stress: negative for ischemia at 81bpm (51% predicted); asymptomatic during test  - continue ASA, statin, beta blocker          Code Status:     Full    Davon España MD  [facility] Internal Medicine HO1    [facility] Medicine Hospitalist Pager numbers:   [facility] Hospitalist Medicine Team A (Trevor/Sergei): [phone]  [facility] Hospitalist Medicine Team B (Eugene/Misty):  [phone]

## 2017-08-26 NOTE — PLAN OF CARE
Problem: Patient Care Overview  Goal: Plan of Care Review  Outcome: Ongoing (interventions implemented as appropriate)  Medicated x 1 for generalized  pain with adequate relief.  IVF infusing.  Voiding.  C/o chest pain EKG done, nitro given with moderate relief. NSR on telemetry with HR in the 80's.  Bed alarm on for safety.  Will continue to monitor.

## 2017-08-26 NOTE — PLAN OF CARE
Was called by nursing staff about new onset chest pain. Went to evaluate patient and she stated she felt non radiating substernal chest tightness that lasted for about 7 minutes around 2 AM and then again around 3 when she let the nurse know. She states she normally takes nitro SL at home when she experiences these symptoms. She denied SOB, diaphoresis, confusion, epigastric pain, anxiety, vision changes. She was normocardic on exam, with no acute findings. I ordered a STAT EKG, which revealed a TWI in V2 which is new from previous EKG a day prior. Tn remain <0.006  She was given nitro SL tablet with resolution of symptoms. She recently had a stress test which did not reveal ischemia a month ago. Will continue to monitor

## 2017-08-26 NOTE — PLAN OF CARE
.\     08/26/17 1324   Discharge Assessment   Assessment Type Discharge Planning Assessment   Confirmed/corrected address and phone number on facesheet? Yes   Assessment information obtained from? Patient   Communicated expected length of stay with patient/caregiver yes   Prior to hospitilization cognitive status: Alert/Oriented   Prior to hospitalization functional status: Independent   Current cognitive status: Alert/Oriented   Current Functional Status: Independent   Lives With spouse   Able to Return to Prior Arrangements yes   Is patient able to care for self after discharge? Yes   Who are your caregiver(s) and their phone number(s)? [email or web address]- [phone]   Patient's perception of discharge disposition home or selfcare   Readmission Within The Last 30 Days no previous admission in last 30 days   Patient currently being followed by outpatient case management? No   Patient currently receives any other outside agency services? No   Equipment Currently Used at Home cane, straight   Do you have any problems affording any of your prescribed medications? Yes   Is the patient taking medications as prescribed? yes   Does the patient have transportation home? Yes   Transportation Available family or friend will provide   Does the patient receive services at the [facility]? No   Discharge Plan A Home with family   Discharge Plan B Home with family   Patient/Family In Agreement With Plan yes   Does the patient have transportation to healthcare appointments? Yes   Readmission Questionnaire   Have you felt down, depressed, or hopeless? 0  (pt states not at this time)   Have you felt little interest or pleasure in doing things? 0  (states not at this time)

## 2017-08-26 NOTE — PLAN OF CARE
Pt to d/c today, spouse at bedside. noHH or DME needs          08/26/17 1326   Final Note   Assessment Type Final Discharge Note   Discharge Disposition Home   What phone number can be called within the next 1-3 days to see how you are doing after discharge? [phone]   Hospital Follow Up  Appt(s) scheduled? No  (pt tequila barragan own follow up on Monday )   Discharge plans and expectations educations in teach back method with documentation complete? Yes   Right Care Referral Info   Post Acute Recommendation No Care

## 2017-08-26 NOTE — NURSING
Patient discharge instructions given and reviewed. Med rec reviewed with Patient. Patient verbalized understanding. Education provided on new medication and diagnosis. PIV d/miya tip intact. Pt tolerated well. Tele box returned to nurses station. Awaiting transportation home.

## 2017-08-26 NOTE — PLAN OF CARE
Problem: Patient Care Overview  Goal: Plan of Care Review  Outcome: Ongoing (interventions implemented as appropriate)  Pt on RA with sats of 96. Will continue to monitor.

## 2017-08-26 NOTE — PROGRESS NOTES
"[facility] Internal Medicine Resident HOErickaI Progress Note    Subjective:      Min Cerda is feeling well this morning. She had an episode of chest pain last night that resolved with her home dose of nitroglycerin. She denies SOB. She continues to complain of pain in her right foot. Ate well last night and eating breakfast this morning.      Objective:   Last 24 Hour Vital Signs:  BP  Min: 70/45  Max: 124/58  Temp  Av.4 °F (36.3 °C)  Min: 96.2 °F (35.7 °C)  Max: 98.3 °F (36.8 °C)  Pulse  Av.8  Min: 66  Max: 88  Resp  Av.3  Min: 15  Max: 27  SpO2  Av.9 %  Min: 87 %  Max: 100 %  Height  Av' 1" (154.9 cm)  Min: 5' 1" (154.9 cm)  Max: 5' 1" (154.9 cm)  Weight  Av.8 kg (89 lb 14.8 oz)  Min: 40.8 kg (89 lb 14.4 oz)  Max: 40.8 kg (89 lb 15.2 oz)  I/O last 3 completed shifts:  In: 1678.3 [P.O.:120; I.V.:1558.3]  Out: 2470 [Urine:2470]    Physical Examination:  General: Alert, Awake, Oriented x 3, No Acute Distress  Head: normocephalic, atraumatic  Eyes: PERRL, EOMI, no conjunctival pallor  Mouth and Throat: moist mucus membranes  Neck: no lymphadenopathy appreciated  Respiratory: lungs clear to ascultation bilaterally, no accessory use of muscles   Cardiovascular: regular rate with regular rhythm, no murmurs  Gastrointestinal: soft, nontender, nondistended,no rebound or guarding, normoactive bowel sounds.   Extremities: pulses DP 1+, no pitting edema, increased swelling R foot  Neuro:  full strength even in all extremities, no sensory deficits.   Skin: excoriations, minor, on lower extremities 2/2 to falls     Laboratory:  Laboratory Data Reviewed:  Trended Lab Data:    Recent Labs  Lab 17  1200 17  1551 17  0334   WBC 8.63  --  9.40   HGB 11.6*  --  9.8*   HCT 33.8*  --  28.9*     --  213   MCV 93  --  93   RDW 14.6*  --  14.8*   * 134* 137   K 4.3 4.2 4.4   CL 97 108 109   CO2 19* 16* 21*   BUN 62* 53* 33*   CREATININE 3.26* 2.3* 1.2   * 101 87   PROT 6.4  --   " --    ALBUMIN 3.8  --   --    BILITOT 0.3  --   --    AST 16  --   --    ALKPHOS 98  --   --    ALT 23  --   --        Trended Cardiac Data:    Recent Labs  Lab 08/25/17  1551 08/25/17  1808 08/26/17  0334   TROPONINI <0.006 <0.006 <0.006       Microbiology Data   Microbiology Results (last 7 days)     Procedure Component Value Units Date/Time    Urine culture [[id number]] Collected:  08/25/17 1816    Order Status:  Sent Specimen:  Urine from Urine, Clean Catch Updated:  08/25/17 2055          Radiology:  Imaging Results          US Retroperitoneal Complete (Kidney and (Final result)  Result time 08/25/17 20:17:12    Final result by Ricci Fang MD (08/25/17 20:17:12)                 Impression:       1.  Moderate right-sided pelvocaliectasis with increased resistive index of the right kidney.  No obstructing stone is identified.  Short-term followup is suggested.     2.  Normal appearance of the left kidney.    3.  Nonspecific distention of the urinary bladder.      Electronically signed by: RICCI FANG MD  Date:     08/25/17  Time:    20:17              Narrative:    Exam: [id number]  08/25/17  19:30:39 [id number] ([facility]) : US RETROPERITONEAL COMPLETE    Technique:    Complete retroperitoneal ultrasound was performed.    Comparison:     None     Findings:      The right kidney measures 10.9 x 4.8 x 4.3 cm.  The corticomedullary differentiation is maintained.  There is moderate right sided pelvocaliectasis.  No stones are identified.  There is normal perfusion to the right kidney.  The right resistive index is increased measuring 0.82.    The left kidney measures 9.2 x 3.6 x 4.4 cm.  The corticomedullary differentiation is maintained.  There is no evidence of left-sided hydroureteronephrosis.  There is normal perfusion of the left kidney.  The left resistive index is within normal limits measuring 0.6    The urinary bladder is distended.                             X-Ray Chest 1 View (Final result)  Result time 08/25/17  12:41:22    Final result by JASON Bundy Sr., MD (08/25/17 12:41:22)                 Impression:        1. The lungs are clear.   2. There are surgical clips projected over the right upper quadrant of the abdomen. This is characteristic of a prior cholecystectomy.        Electronically signed by: JASON BUNDY MD  Date:     08/25/17  Time:    12:41              Narrative:    One-view chest x-ray    Clinical History: Unspecified fall, initial encounter    Finding: Comparison was made to a prior examination performed on 7/15/2016. The size of the heart is normal. The lungs are clear. There is no pneumothorax.  The costophrenic angles are sharp. There are surgical clips projected over the right upper quadrant of the abdomen.                                Current Medications:     Infusions:   lactated Ringers 125 mL/hr at 08/26/17 0020        Scheduled:   aspirin  81 mg Oral Daily    cilostazol  50 mg Oral BID    doxepin  100 mg Oral QHS    metoprolol tartrate  25 mg Oral BID    pravastatin  20 mg Oral Daily        PRN:  diazePAM, hydrocodone-acetaminophen 5-325mg, nitroGLYCERIN    Antibiotics and Day Number of Therapy:    Lines and Day Number of Therapy:  PIV    Assessment and Plan     Min Cerda is a 56 y.o.female with    ERICKSON, resolving  - creatinine is 3.26 and BUN is 62 at admission  - likely secondary to decreased PO intake but could be multifactorial   - will continue IV fluids and trend   - will hold nephrotoxic medications  - patient takes Mobic regularly--holding   - possibility of urinary retention  - will order renal US to check for SABRINA  - SABRINA showed bladder distention and moderate right sided pelvocaliectasis  - duncan placed  - Cr 1.2 from 2.3; BUN is 33     Hypotension  - presented with systolic pressure in 80s/90s with lowest systolic reading at 70  - responded to fluid boluses with pressure improving to over 100 systolic  - pressures remain stable  - now on continuous LR at  125/hr     Depression  - signs and symptoms of severe depression  - suspect to be primary  of this presentation  - will consult psych     Weight loss/nutrition  - most likely secondary to poor PO intake  - little concern for more sinister cause at this point  - patient ate dinner and had no complaints      Hyponatremia, asymptomatic  - mild at 131  - patient is hypovolemic on exam  - will get urine NA-->20  - will continue IV fluids (NS) in setting of decreased PO intake and hypotension   - continuous LR at 125;  this am     Anxiety  - endorses baseline anxiety that is controlled with daily valium  - will 5mg valium BID and encourage an alternative strategy for anxiety management after discharge     Peripheral Artery Disease  - pulses are faint in distal extremities  - has seen Dr. Foster in the past  - will monitor for signs of acute ischemia  - will consult Dr. Foster   - continue statin     Falls   - likely secondary to hypotension  - not associated with LOC so less likely syncopal episodes  - not associated with bowel or bladder incontinence   - unlikely to be neurological cause     COPD   - no evidence of acute exacerbation   - no increased cough or SOB  - will offer support for smoking cessation  - desaturations overnght, placed on nasal cannula 2 L     CAD  - strong family history  - ECHO 7/15 with normal EF   - NM Study Stress: negative for ischemia at 81bpm (51% predicted); asymptomatic during test  - continue ASA, statin, beta blocker  - PRN nitro    Diet: adult regular  Ppx: SCDs  Dispo: pending psych assessment          Davon España  [facility] [place] [facility] Internal [facility] -[facility] Hospital Service Team B    [facility] [place] Medicine Hospitalist Pager numbers:   [facility] [place] Hospitalist Medicine Team A (Trevor/Sergei): [phone]  [facility] [place] Hospitalist Medicine Team B (Eugene/Misty):  [phone]    Patient seen and examined, case discussed with resident care team and agree.

## 2017-08-28 LAB — BACTERIA UR CULT: NORMAL

## 2017-08-28 NOTE — PSYCH
"IDENTIFICATION DATA:  This is a 56-year-old  white female who was brought   to ER due to weakness, nausea, depression and frequent falls.  This consult is   requested by Raymon Jay for psych evaluation.  The patient is not on a PEC   status.    CHIEF COMPLAINT:  "I was feeling weak and losing weight."    HISTORY OF PRESENT ILLNESS:  According to H and P and ER note, the patient was   feeling weak and had persistent nausea.  The patient had frequent falls.  She   was hypotensive and dehydrated upon arrival.  The patient has a history of   anxiety and depression and was diagnosed with bipolar before.  The patient   states that she suffers from depression for a long time.  Her depression started   in  when she lost her dad in .  She lost her son in 2016 and mom    in 2016.  The patient states that she is worried about her son who is ill and   has bipolar illness.  The patient states that her depression is still there.    She feels sad, amotivated, tired, insomnic, anxious, irritable, but no suicidal   thoughts.  The patient thought that she had manic episode, but family did not   recall any manic episode.  The patient states that she suffers from anxiety   since childhood, upon clarification she believes that it started at the end of   her school when she was in her early teens.  She gets worried easily about every   other thing and at times hard to control her anxiety and gets panic attacks,   especially in the crowded social and claustrophobic situation.  She was on   Valium 10 mg three times a day in the past, which was reduced to 5 mg twice a   day.  She does not drink or do drugs.  She denies auditory, visual, tactile, or   olfactory hallucinations.  She denies flashbacks, nightmares, or   obsessive-compulsive features.  She is consistent with medicine and sees Dr. Carey at [facility].  She is worried about her son and    family members.  She is worried about her " health also.    MEDICAL HISTORY:  The patient has COPD, coronary artery disease, peripheral   arterial disease, neck pain and frequent falls.    MEDICATIONS:  She was on doxepin 150 twice, 150 mg at nighttime and Valium 5 or   10 mg 2-3 times in the past.  She is also on Pravachol, albuterol, Mobic,   Pletal.  She is on Zanaflex and Topamax.  Her Valium was reduced to 5 mg as a   p.r.n. at the hospital and doxepin was reduced to 100.  Her blood pressure 90/61   with 78 pulse.  She was under 100 systolic, upon arrival and bolus   helped with hypotension.    LABORATORY DATA:  Her scan shows right-sided pelvocaliectasis along with   possibility of partial obstruction.  She has UTI.  Her BUN and folate are   normal.  CBC shows WBC 94, hemoglobin 9.8, hematocrit 28, MCV 93, platelets 230   and sodium upon arrival was 131, now it is 137, potassium 3.7.  Blood sugar 87.    Her GFR is 50, BUN 33, creatinine 1.2.    ALLERGIES:  SHE IS ALLERGIC TO SEROQUEL, TRAMADOL, CECLOR, SULFA AND IODINE.    See H and P for details.    MENTAL STATUS EXAMINATION:  This is a 56-year-old white female who looks older   than her stated age.  She is alert, cooperative, guarded and oriented to day,   date, month and year.  Mood is depressed with sad affect.  Psychomotor activity   is decreased.  Speech is soft, decreased in amount and normal in rate and tone.    No racing thoughts, loose association or flight of ideas noted.  She states   that she distracts easily.  At present, she is attentive and organized.  She is   able to recall three objects out of three immediately, two out of three after 5   minutes and one out of three with some assistance.  She has noticed some   forgetfulness lately.  She denies hearing voices or seeing things.  She has no   intention to harm to self or others.  Insight and judgment are fair.  She is of   average intelligence.    PSYCHIATRIC DIAGNOSES:  AXIS I:  Major depressive disorder, recurrent without psychotic  features,   generalized anxiety disorder, history of panic disorder with agoraphobia, social   anxiety.  AXIS II:  No diagnosis.  AXIS III:  Coronary artery disease, chronic obstructive pulmonary disease,   peripheral artery disease, neck pain, urinary tract infection.  AXIS IV:  Medical problems, missing  family members, worried about her   son, partial response to medications, dependent on benzos.  AXIS IV:  50.    RECOMMENDATIONS:  1.  Agreed with the reduction of doxepin to 100 mg, which could be reduced   further on an outpatient basis.  2.  We will initiate Lexapro 5 mg p.o. q.a.m. for two days, then 10 mg p.o.   q.a.m. and then with doxepin for anxiety, panic and depression.    3.  Agreed with the reduction of Valium.  The patient sees Dr. Carey at   [facility] who prescribes Valium 5 mg twice a day.  We will monitor   benzos withdrawal.  4.  The patient has no history of suicide, homicide, alcohol and drug   rehabilitation.  She sees Dr. Carey at [facility].  The patient   has no history of inpatient psychiatric treatment.  5.  We will discharge this patient back to home once medically stable.  The   patient would follow Dr. Carey at [facility] for   medications.    ASSETS:  The patient is verbal, cooperative, compliant with medicine and has   supportive family at home.          /angelica [id number] leann(s)        DOUGLAS/IN  dd: 2017 11:22:33 (CDT)  td: 2017 16:14:28 (CDT)  Doc ID   #[id number]  Job ID #[id number]    CC:

## 2017-08-29 ENCOUNTER — OFFICE VISIT (OUTPATIENT)
Dept: CARDIOLOGY | Facility: CLINIC | Age: 57
End: 2017-08-29
Payer: COMMERCIAL

## 2017-08-29 VITALS
HEART RATE: 113 BPM | DIASTOLIC BLOOD PRESSURE: 70 MMHG | HEIGHT: 61 IN | SYSTOLIC BLOOD PRESSURE: 105 MMHG | BODY MASS INDEX: 17.61 KG/M2 | OXYGEN SATURATION: 96 % | WEIGHT: 93.31 LBS

## 2017-08-29 DIAGNOSIS — N17.9 ACUTE RENAL FAILURE, UNSPECIFIED ACUTE RENAL FAILURE TYPE: ICD-10-CM

## 2017-08-29 DIAGNOSIS — R29.898 WEAKNESS OF BOTH LOWER EXTREMITIES: ICD-10-CM

## 2017-08-29 DIAGNOSIS — N17.9 AKI (ACUTE KIDNEY INJURY): ICD-10-CM

## 2017-08-29 DIAGNOSIS — R07.9 CHEST PAIN, UNSPECIFIED TYPE: Primary | ICD-10-CM

## 2017-08-29 DIAGNOSIS — F41.9 ANXIETY: ICD-10-CM

## 2017-08-29 DIAGNOSIS — I73.9 PAD (PERIPHERAL ARTERY DISEASE): ICD-10-CM

## 2017-08-29 DIAGNOSIS — J44.9 CHRONIC OBSTRUCTIVE PULMONARY DISEASE, UNSPECIFIED COPD TYPE: ICD-10-CM

## 2017-08-29 DIAGNOSIS — R06.09 DOE (DYSPNEA ON EXERTION): ICD-10-CM

## 2017-08-29 PROCEDURE — 3074F SYST BP LT 130 MM HG: CPT | Mod: S$GLB,,, | Performed by: INTERNAL MEDICINE

## 2017-08-29 PROCEDURE — 99214 OFFICE O/P EST MOD 30 MIN: CPT | Mod: S$GLB,,, | Performed by: INTERNAL MEDICINE

## 2017-08-29 PROCEDURE — 3008F BODY MASS INDEX DOCD: CPT | Mod: S$GLB,,, | Performed by: INTERNAL MEDICINE

## 2017-08-29 PROCEDURE — 3078F DIAST BP <80 MM HG: CPT | Mod: S$GLB,,, | Performed by: INTERNAL MEDICINE

## 2017-08-29 PROCEDURE — 99999 PR PBB SHADOW E&M-EST. PATIENT-LVL III: CPT | Mod: PBBFAC,,, | Performed by: INTERNAL MEDICINE

## 2017-08-29 RX ORDER — AMLODIPINE AND BENAZEPRIL HYDROCHLORIDE 10; 20 MG/1; MG/1
CAPSULE ORAL
COMMUNITY
Start: 2017-08-11 | End: 2017-10-03

## 2017-08-29 RX ORDER — MELOXICAM 15 MG/1
TABLET ORAL
COMMUNITY
Start: 2017-07-29

## 2017-08-29 NOTE — DISCHARGE SUMMARY
[facility] Internal Medicine Discharge Summary    Primary Team: [facility]  Attending Physician:Misty   Resident: Pari  Intern: Taco    Date of Admit: 8/25/2017  Date of Discharge: 8/29/2017    Discharge to: home  Condition: stable     Discharge Diagnoses     Patient Active Problem List   Diagnosis    COPD (chronic obstructive pulmonary disease)    Anxiety    Weakness of both lower extremities    EASLEY (dyspnea on exertion)    Acute renal failure       Consultants and Procedures     Consultants:  Psych    Procedures:   None    Brief History of Present Illness      Min Cerda is a 56 y.o. female who  has a past medical history of Anxiety; Bipolar affective; COPD (chronic obstructive pulmonary disease); and Neck pain.  The patient presented to [facility] on 8/25/2017 with a primary complaint of Fall (I have been falling over the last couple weeks and my knees giving out and the top my body feels weak. Dr Wright sent me here because i lost 20 lbs over one month)    Patient presented with depressive symptoms, poor PO intake, significant weight loss in last month.       For the full HPI please refer to the History & Physical from this admission.    Hospital Course By Problem with Pertinent Findings     ERICKSON, resolving  - creatinine is 3.26 and BUN is 62 at admission  - likely secondary to decreased PO intake but could be multifactorial   - will continue IV fluids and trend   - will hold nephrotoxic medications  - patient takes Mobic regularly--holding   - possibility of urinary retention  - will order renal US to check for SABRINA  - SABRINA showed bladder distention and moderate right sided pelvocaliectasis  - duncan placed  - Cr 1.2 from 2.3; BUN is 33     Hypotension  - presented with systolic pressure in 80s/90s with lowest systolic reading at 70  - responded to fluid boluses with pressure improving to over 100 systolic  - pressures remain stable  - now on continuous LR at 125/hr  -  improved; systolic pressures remained over 100     Depression  - signs and symptoms of severe depression  - suspect to be primary  of this presentation  - will consult psych  - psych did not think patient met criteria for inpatient admission  - lexapro 10 mg started  - will follow up with outpatient psychiatrist      Weight loss/nutrition  - most likely secondary to poor PO intake  - little concern for more sinister cause at this point  - patient ate dinner and had no complaints      Hyponatremia, asymptomatic  - mild at 131  - patient is hypovolemic on exam  - will get urine NA-->20  - will continue IV fluids (NS) in setting of decreased PO intake and hypotension   - continuous LR at 125;  this am     Anxiety  - endorses baseline anxiety that is controlled with daily valium  - will 5mg valium BID and encourage an alternative strategy for anxiety management after discharge  - started on lexapro 10mg---patient was unsure if she wanted to start taking this medication; gave prescription and told her to decide what she wanted to do/talk to her outpatient psychiatrist  - advised her to speak to her outpatient psychiatrist about medications     Peripheral Artery Disease  - pulses are faint in distal extremities  - has seen Dr. Foster in the past  - will monitor for signs of acute ischemia  - will consult Dr. Foster   - continue statin  - will follow up with Dr. Foster as outpatient      Falls   - likely secondary to hypotension  - not associated with LOC so less likely syncopal episodes  - not associated with bowel or bladder incontinence   - unlikely to be neurological cause     COPD   - no evidence of acute exacerbation   - no increased cough or SOB  - will offer support for smoking cessation  - desaturations overnght, placed on nasal cannula 2 L  - well controlled; will follow with PCP     CAD  - strong family history  - ECHO 7/15 with normal EF   - NM Study Stress: negative for ischemia at 81bpm (51%  predicted); asymptomatic during test  - continue ASA, statin, beta blocker  - PRN nitro  - will have close outpatient follow up with cardiology     Discharge Medications        Medication List      START taking these medications    escitalopram oxalate 10 MG tablet  Commonly known as:  LEXAPRO  Take 1 tablet (10 mg total) by mouth once daily.        CHANGE how you take these medications    diazePAM 5 MG tablet  Commonly known as:  VALIUM  Take 1 tablet (5 mg total) by mouth every 12 (twelve) hours as needed (Anxiety).  What changed:  · medication strength  · how much to take  · when to take this  · reasons to take this     doxepin 100 MG capsule  Commonly known as:  SINEQUAN  Take 1 capsule (100 mg total) by mouth every evening.  What changed:  · medication strength  · how much to take        CONTINUE taking these medications    aspirin 81 MG Chew  Take 1 tablet (81 mg total) by mouth once daily.     cilostazol 50 MG Tab  Commonly known as:  PLETAL  Take 1 tablet (50 mg total) by mouth 2 (two) times daily.     fluticasone-salmeterol 250-50 mcg/dose 250-50 mcg/dose diskus inhaler  Commonly known as:  ADVAIR     folic acid 1 MG tablet  Commonly known as:  FOLVITE     gabapentin 300 MG capsule  Commonly known as:  NEURONTIN     hydrocodone-acetaminophen 10-325mg  mg Tab  Commonly known as:  NORCO     LINZESS 290 mcg Cap  Generic drug:  linaclotide     metoprolol succinate 25 MG 24 hr tablet  Commonly known as:  TOPROL-XL     nitroGLYCERIN 0.4 MG SL tablet  Commonly known as:  NITROSTAT     pravastatin 20 MG tablet  Commonly known as:  PRAVACHOL  Take 1 tablet (20 mg total) by mouth once daily.     predniSONE 50 MG Tab  Commonly known as:  DELTASONE  Take 1 tablet (50 mg total) by mouth as needed (for procedure). Take 1 tab 13 hours before, 7 hours before, 1 hour before procedure     PROAIR HFA 90 mcg/actuation inhaler  Generic drug:  albuterol        STOP taking these medications    cyproheptadine 4 mg  tablet  Commonly known as:  PERIACTIN     meloxicam 7.5 MG tablet  Commonly known as:  MOBIC     mirtazapine 15 MG tablet  Commonly known as:  REMERON     promethazine 25 MG tablet  Commonly known as:  PHENERGAN     tizanidine 4 MG tablet  Commonly known as:  ZANAFLEX     topiramate 50 MG tablet  Commonly known as:  TOPAMAX           Where to Get Your Medications      You can get these medications from any pharmacy    Bring a paper prescription for each of these medications  · escitalopram oxalate 10 MG tablet     Information about where to get these medications is not yet available    Ask your nurse or doctor about these medications  · diazePAM 5 MG tablet  · doxepin 100 MG capsule         Discharge Information:   Diet:  Regular     Physical Activity:  As tolerated     Instructions:  1. Take all medications as prescribed  2. Keep all follow-up appointments  3. Return to the hospital or call your primary care physicians if any worsening symptoms such as lightheadedness, falls or intractable nausea/vomiting occur.      Follow-Up Appointments:      Davon España  [facility] Internal Medicine, HO-1

## 2017-08-29 NOTE — PROGRESS NOTES
Subjective:    Patient ID:  Min Cerda is a 56 y.o. female who presents for follow-up of Chest Pain      HPI  55 y/o female with hx of PAD suggested by recent arterial doppler, COPD, active tobacco use, fam hx of early CAD who presents for f/u. Last clinic visit she was seen for symptoms concerning for LE claudication and CP. CP was evaluated with nuclear SPECT which was normal. Arterial doppler read as mild/moderate atherosclerotic plaquing. On my review, waveforms are dampened and monophasic throughout. Since last clinic visit she had been feeling dizzy, lightheaded, and SOB. She presented to ED and found to have ERICKSON with dehydration. Was admitted to hospitals, given IVF hydration, improvement in renal function, retroperitoneal US with moderate right-sided pelvocaliectasis with increased resistive index of the right kidney. Since discharge she is doing better. She states that she hasn't been urinating much since discharge. She has been having intermittent substernal CP that improves with nitro. Continues to have bilateral LE weakness with ambulation and bilateral foot pain. Has chronic EASLEY. Denies orthopnea, PND, syncope. Continues to smoke, but is trying to quit. Compliant with meds.     Review of Systems   Constitution: Positive for malaise/fatigue. Negative for weakness.   HENT: Negative for congestion and headaches.    Eyes: Negative for blurred vision.   Cardiovascular: Positive for claudication and dyspnea on exertion. Negative for chest pain, cyanosis, irregular heartbeat, leg swelling, near-syncope, orthopnea, palpitations, paroxysmal nocturnal dyspnea and syncope.   Respiratory: Negative for shortness of breath.    Endocrine: Negative for polyuria.   Hematologic/Lymphatic: Negative for bleeding problem.   Skin: Negative for itching and rash.   Musculoskeletal: Positive for joint pain and muscle weakness. Negative for joint swelling and muscle cramps.   Gastrointestinal: Negative for abdominal pain,  hematemesis, hematochezia, melena, nausea and vomiting.   Genitourinary: Negative for dysuria and hematuria.   Neurological: Negative for dizziness, focal weakness, light-headedness and loss of balance.   Psychiatric/Behavioral: Negative for depression. The patient is not nervous/anxious.         Objective:    Physical Exam   Constitutional: She is oriented to person, place, and time. She appears well-developed and well-nourished.   HENT:   Head: Normocephalic and atraumatic.   Neck: Neck supple. No JVD present.   Cardiovascular: Normal rate, regular rhythm and normal heart sounds.    Pulses:       Carotid pulses are 2+ on the right side, and 2+ on the left side.       Radial pulses are 2+ on the right side, and 2+ on the left side.        Femoral pulses are 2+ on the right side, and 2+ on the left side.       Dorsalis pedis pulses are 2+ on the right side, and 2+ on the left side.        Posterior tibial pulses are 2+ on the right side, and 2+ on the left side.   Pulmonary/Chest: Effort normal and breath sounds normal.   Abdominal: Soft. Bowel sounds are normal.   Musculoskeletal: She exhibits no edema.   Neurological: She is alert and oriented to person, place, and time.   Skin: Skin is warm and dry.   Psychiatric: She has a normal mood and affect. Her behavior is normal. Thought content normal.         Assessment:       1. Chest pain, unspecified type    2. ERICKSON (acute kidney injury)    3. Anxiety    4. Chronic obstructive pulmonary disease, unspecified COPD type    5. Acute renal failure, unspecified acute renal failure type    6. Weakness of both lower extremities    7. EASLEY (dyspnea on exertion)    8. PAD (peripheral artery disease)      57 y/o female with hx and presentation as above. Renal function improved prior to discharge, but needs to f/u with nephrology given ERICKSON and findings on imaging. Will hold CTA to evaluate for PAD for now given recent ERICKSON and risk for MIHAI. Stress test was negative and her CP may  be GI related. Needs to stop smoking.        Plan:       -Nephrology referral  -D/C CTA  -f/u in 1 month

## 2017-08-30 ENCOUNTER — TELEPHONE (OUTPATIENT)
Dept: NEPHROLOGY | Facility: CLINIC | Age: 57
End: 2017-08-30

## 2017-08-30 NOTE — TELEPHONE ENCOUNTER
----- Message from Brianna Shah sent at 8/30/2017  9:50 AM CDT -----  Contact: self/ [phone]  Patient has a referral for ERICKSON (acute kidney injury from Dr. Nii Foster; please call to schedule. Thank you.       Called pt made and mailed appt

## 2017-08-30 NOTE — PHYSICIAN QUERY
PT Name: Min Cerda  MR #: [id number]    Physician Query Form - Consultant Diagnosis Clarification     CDS/: Brice Preston RN              Contact information:    [email or web address]  This form is a permanent document in the medical record.        Query Date: August 30, 2017      By submitting this query, we are merely seeking further clarification of documentation.  Please utilize your independent clinical judgment when addressing the question(s) below.      The Medical record contains the following:   Diagnosis Supporting Clinical Information Location in Medical Record   UTI / urinary tract infection.     She has UTI.  ...  Her scan shows right-sided pelvocaliectasis along with possibility of partial obstruction.    Culture urine: E. Coli   Psych   Psych      Micro 8/25         Do you agree with the Consultants diagnosis of ____UTI ______________?                 [   ]   Yes, with E. coli               [   ]   Yes, but it resolved prior to my assessment of the patient               [   ]   No                [   ]   Clinically insignificant               [   ]   Clinically undetermined               [  X ]   Other/Clarification of findings: ___urine culture resulted after discharge ________________________________________

## 2017-09-13 NOTE — PLAN OF CARE
Attempted to contact patient multiple times and left voicemail at home and emergency contact phone number regarding ESBL+ urine culture from 8/25 resulted from 8/28. Patient had been admitted with AMS and ERICKSON 2/2 volume depletion but did not have dysuria, leukocytosis or fevers therefore was not discharged on antibiotics.    Kerry Yañez  [facility] -III

## 2017-09-29 ENCOUNTER — LAB VISIT (OUTPATIENT)
Dept: LAB | Facility: HOSPITAL | Age: 57
End: 2017-09-29
Attending: INTERNAL MEDICINE
Payer: COMMERCIAL

## 2017-09-29 DIAGNOSIS — I10 HTN (HYPERTENSION): Primary | ICD-10-CM

## 2017-09-29 LAB
ALBUMIN SERPL BCP-MCNC: 4.1 G/DL
ALP SERPL-CCNC: 75 U/L
ALT SERPL W/O P-5'-P-CCNC: 24 U/L
ANION GAP SERPL CALC-SCNC: 8 MMOL/L
AST SERPL-CCNC: 13 U/L
BASOPHILS # BLD AUTO: 0.04 K/UL
BASOPHILS NFR BLD: 0.5 %
BILIRUB SERPL-MCNC: 0.1 MG/DL
BUN SERPL-MCNC: 11 MG/DL
CALCIUM SERPL-MCNC: 8.7 MG/DL
CHLORIDE SERPL-SCNC: 93 MMOL/L
CO2 SERPL-SCNC: 23 MMOL/L
CREAT SERPL-MCNC: 0.84 MG/DL
DIFFERENTIAL METHOD: ABNORMAL
EOSINOPHIL # BLD AUTO: 0.3 K/UL
EOSINOPHIL NFR BLD: 3.7 %
ERYTHROCYTE [DISTWIDTH] IN BLOOD BY AUTOMATED COUNT: 14.6 %
EST. GFR  (AFRICAN AMERICAN): >60 ML/MIN/1.73 M^2
EST. GFR  (NON AFRICAN AMERICAN): >60 ML/MIN/1.73 M^2
GLUCOSE SERPL-MCNC: 89 MG/DL
HCT VFR BLD AUTO: 35.2 %
HGB BLD-MCNC: 12 G/DL
LYMPHOCYTES # BLD AUTO: 2.6 K/UL
LYMPHOCYTES NFR BLD: 31 %
MCH RBC QN AUTO: 32.2 PG
MCHC RBC AUTO-ENTMCNC: 34.1 G/DL
MCV RBC AUTO: 94 FL
MONOCYTES # BLD AUTO: 0.7 K/UL
MONOCYTES NFR BLD: 7.9 %
NEUTROPHILS # BLD AUTO: 4.7 K/UL
NEUTROPHILS NFR BLD: 56.1 %
PLATELET # BLD AUTO: 292 K/UL
PMV BLD AUTO: 9.5 FL
POTASSIUM SERPL-SCNC: 4.7 MMOL/L
PROT SERPL-MCNC: 6.5 G/DL
RBC # BLD AUTO: 3.73 M/UL
SODIUM SERPL-SCNC: 124 MMOL/L
TSH SERPL DL<=0.005 MIU/L-ACNC: 1.01 UIU/ML
WBC # BLD AUTO: 8.45 K/UL

## 2017-09-29 PROCEDURE — 85025 COMPLETE CBC W/AUTO DIFF WBC: CPT | Mod: PO

## 2017-09-29 PROCEDURE — 36415 COLL VENOUS BLD VENIPUNCTURE: CPT | Mod: PO

## 2017-09-29 PROCEDURE — 84443 ASSAY THYROID STIM HORMONE: CPT | Mod: PO

## 2017-09-29 PROCEDURE — 80053 COMPREHEN METABOLIC PANEL: CPT | Mod: PO

## 2017-10-03 ENCOUNTER — OFFICE VISIT (OUTPATIENT)
Dept: NEPHROLOGY | Facility: CLINIC | Age: 57
End: 2017-10-03
Payer: COMMERCIAL

## 2017-10-03 VITALS
BODY MASS INDEX: 17.07 KG/M2 | HEIGHT: 61 IN | OXYGEN SATURATION: 98 % | HEART RATE: 68 BPM | DIASTOLIC BLOOD PRESSURE: 70 MMHG | WEIGHT: 90.38 LBS | SYSTOLIC BLOOD PRESSURE: 110 MMHG

## 2017-10-03 DIAGNOSIS — N17.9 AKI (ACUTE KIDNEY INJURY): Primary | ICD-10-CM

## 2017-10-03 DIAGNOSIS — R82.90 ABNORMAL URINALYSIS: ICD-10-CM

## 2017-10-03 DIAGNOSIS — N28.89 PELVICALIECTASIS: ICD-10-CM

## 2017-10-03 DIAGNOSIS — E87.1 HYPONATREMIA: ICD-10-CM

## 2017-10-03 PROCEDURE — 99204 OFFICE O/P NEW MOD 45 MIN: CPT | Mod: S$GLB,,, | Performed by: INTERNAL MEDICINE

## 2017-10-03 PROCEDURE — 99999 PR PBB SHADOW E&M-EST. PATIENT-LVL IV: CPT | Mod: PBBFAC,,, | Performed by: INTERNAL MEDICINE

## 2017-10-03 RX ORDER — GABAPENTIN 600 MG/1
TABLET ORAL
COMMUNITY
Start: 2017-09-09

## 2017-10-03 RX ORDER — BUTALBITAL, ACETAMINOPHEN AND CAFFEINE 50; 325; 40 MG/1; MG/1; MG/1
TABLET ORAL
COMMUNITY
Start: 2017-09-09

## 2017-10-03 RX ORDER — TOPIRAMATE 100 MG/1
TABLET, FILM COATED ORAL
COMMUNITY
Start: 2017-09-29 | End: 2017-10-03

## 2017-10-03 RX ORDER — PROMETHAZINE HYDROCHLORIDE 25 MG/1
TABLET ORAL
COMMUNITY
Start: 2017-09-29

## 2017-10-03 RX ORDER — FLUTICASONE PROPIONATE 220 UG/1
AEROSOL, METERED RESPIRATORY (INHALATION)
COMMUNITY
Start: 2017-09-29 | End: 2017-10-06

## 2017-10-03 RX ORDER — MIRTAZAPINE 15 MG/1
TABLET, FILM COATED ORAL
COMMUNITY
Start: 2017-09-29

## 2017-10-03 NOTE — PROGRESS NOTES
Subjective:       Patient ID: Min Cerda is a 56 y.o. White female who presents for new evaluation of Acute Renal Failure    HPI     Ms. Cerda was seen in nephrology clinic for new patient evaluation of ERICKSON and abnormal US appearance of right kidney. She was referred by her cardiologist Dr. Foster. Prior pertinent chart was reviewed. Pt arrived in the clinic accompanied by her spouse.    She has longstanding hypertension (since her mid 30's), anxiety, bipolar, ongoing tobacco smoking, COPD, PAD and other medical problems. She has been using various NSAID like meloxicam. She reports family h/o kidney disease in her maternal uncle who she knows was using Ibuprofen. She has been on antidepressants.     On 8/25/17 she presented to [facility] with feeling weak, tired, fall and hypotension. She has poor oral intake, reported 20 lbs weight loss from her baseline. She had ERICKSON, creatinine was 3.26, BUN 62 at the time presentation. She was suspected to have prerenal ERICKSON, but also she had hypotension with BP in the range of SBP 80 to 90 at the time of presentation. There was also concern for urinary retention. Work up showed E. Coli UTI. US kidneys showed right kidney with pelvicaliectasis, bladder distention. She had hyponatremia since presentation and her Na levels stayed around 131 to 134.     Pt was treated with IVF in the hospital. She had improvement in her renal function. It did help improve her BP also. She was taken off amlodipine and continued on metoprolol on discharge.     She states now her oral intake has improved. She has no dysuria/ decreased urine/ difficulty with urination. She does have low back pain. She denies any fever/ leg swelling. She has continued to use meloxicam.     She had a follow up labs per her cardiologist from 9/29/17 which noted for Na 124, K 4.7, bicarbnonate 23, BUN 8, creatinine 0.84, Ca 8.7, Hb 12, albumin 4.1, urine study from the hospital noted for pyuria, LE+, E. Coli in  urine culture. Urine Na from during hospitalization was 41.     US Kidneys from 8/25/17 noted    1.  Moderate right-sided pelvocaliectasis with increased resistive index of the right kidney.  No obstructing stone is identified.  Short-term followup is suggested.     2.  Normal appearance of the left kidney.    3.  Nonspecific distention of the urinary bladder.    Review of Systems   Constitutional: Positive for appetite change and unexpected weight change. Negative for chills, fatigue and fever.   HENT: Negative for ear discharge, ear pain, hearing loss and sneezing.    Eyes: Negative for discharge and redness.   Respiratory: Negative for cough, shortness of breath and wheezing.    Cardiovascular: Negative for chest pain and leg swelling.   Gastrointestinal: Negative for abdominal pain and diarrhea.   Endocrine: Negative for polydipsia and polyuria.   Genitourinary: Negative for decreased urine volume, difficulty urinating, dysuria, flank pain, frequency and hematuria.   Musculoskeletal: Positive for arthralgias. Negative for joint swelling and myalgias.   Skin: Negative for pallor and rash.   Allergic/Immunologic: Negative for environmental allergies.   Neurological: Negative for dizziness, light-headedness and headaches.   Psychiatric/Behavioral: Negative for behavioral problems. The patient is not nervous/anxious.        Objective:      Physical Exam   Constitutional: She is oriented to person, place, and time. No distress.   BMI 17  Cachectic and thin   HENT:   Head: Normocephalic.   Mouth/Throat: Oropharynx is clear and moist.   Eyes: Conjunctivae are normal. Right eye exhibits no discharge. Left eye exhibits no discharge. No scleral icterus.   Neck: Neck supple.   Cardiovascular: Normal rate, regular rhythm and normal heart sounds.  Exam reveals no friction rub.    No murmur heard.  Pulmonary/Chest: Effort normal and breath sounds normal. No respiratory distress. She has no wheezes. She has no rales.    Abdominal: Soft. There is no tenderness. There is no guarding.   No mass felt   Genitourinary:   Genitourinary Comments: deferred   Musculoskeletal: She exhibits no edema.   Lymphadenopathy:     She has no cervical adenopathy.   Neurological: She is alert and oriented to person, place, and time.   Speech normal, cognition normal, moving all 4 limbs   Skin: Skin is warm and dry. No rash noted.   Psychiatric: She has a normal mood and affect. Her behavior is normal.   Nursing note and vitals reviewed.      Assessment:       1. ERICKSON (acute kidney injury)    2. Pelvicaliectasis    3. Hyponatremia    4. Abnormal urinalysis        Plan:     ERICKSON was likely multifactorial due to volume depletion (she reports she had vomiting), severe hypotension, likely UTI, urinary retention in the setting of ongoing NSAID use. Suspect CKD at baseline due to her longstanding hypertension, tobacco smoking, PAD, NSAID use. She has family h/o kidney disease. Labs prior to hospitalization in [facility] not available, would be helpful to see if she has chronic hyponatremia and to define her baseline creatinine.    Given her weight loss which is abnormal, ongoing tobacco smoking, hyponatremia, she needs work up to rule out malignancy. Defer to her PCP.    - US kidney to rule out obstruction/ follow up on pelvicaliectasis noted on prior US  - avoid mobic use  - her creatinine is probably not the best marker of her kidney function given her poor muscle mass  - follow sodium levels, urine Na  - urine studies to rule out proteinuria/ hematuria/ UTI  - screen for hep C/B/ paraprotein  - pt counseled to quit tobacco smoking  - home BP monitoring, hold antihypertensive if SBP less than 100 mm hg     Plan, labs, recommendations were discussed with patient, her , their questions were answered to their satisfaction.     RTC 2 months to discuss labs     Total time spent was 40 minutes with >50% time spent in face to face evaluation, counseling  about possible etiology, work up, monitoring, natural course of illness, recommendations.

## 2017-10-03 NOTE — LETTER
October 3, 2017      Nii Foster MD  [address]  [address]  [facility] [address]           [facility] - Nephrology  [address]  [place] [address]  Phone: [phone]  Fax: [phone]          Patient: Min Cerda   MR Number: [id number]   YOB: 1960   Date of Visit: 10/3/2017       Dear Dr. Nii Foster:    Thank you for referring Min Cerda to me for evaluation. Attached you will find relevant portions of my assessment and plan of care.    If you have questions, please do not hesitate to call me. I look forward to following Min Cerda along with you.    Sincerely,    Kimmie Rooney MD    Enclosure  CC:  No Recipients    If you would like to receive this communication electronically, please contact [email or web address] or [phone] to request more information on [employer] Link access.    For providers and/or their staff who would like to refer a patient to [facility], please contact us through our one-stop-shop provider referral line, [facility], at [phone].    If you feel you have received this communication in error or would no longer like to receive these types of communications, please e-mail [email or web address]

## 2017-10-06 ENCOUNTER — OFFICE VISIT (OUTPATIENT)
Dept: CARDIOLOGY | Facility: CLINIC | Age: 57
End: 2017-10-06
Payer: COMMERCIAL

## 2017-10-06 VITALS
DIASTOLIC BLOOD PRESSURE: 52 MMHG | SYSTOLIC BLOOD PRESSURE: 98 MMHG | WEIGHT: 91 LBS | BODY MASS INDEX: 17.19 KG/M2 | HEART RATE: 52 BPM

## 2017-10-06 DIAGNOSIS — R29.898 WEAKNESS OF BOTH LOWER EXTREMITIES: ICD-10-CM

## 2017-10-06 DIAGNOSIS — J44.9 CHRONIC OBSTRUCTIVE PULMONARY DISEASE, UNSPECIFIED COPD TYPE: ICD-10-CM

## 2017-10-06 DIAGNOSIS — N17.9 AKI (ACUTE KIDNEY INJURY): ICD-10-CM

## 2017-10-06 DIAGNOSIS — R06.09 DOE (DYSPNEA ON EXERTION): ICD-10-CM

## 2017-10-06 DIAGNOSIS — E87.1 HYPONATREMIA: ICD-10-CM

## 2017-10-06 DIAGNOSIS — I73.9 PAD (PERIPHERAL ARTERY DISEASE): Primary | ICD-10-CM

## 2017-10-06 PROCEDURE — 99999 PR PBB SHADOW E&M-EST. PATIENT-LVL II: CPT | Mod: PBBFAC,,, | Performed by: INTERNAL MEDICINE

## 2017-10-06 PROCEDURE — 99214 OFFICE O/P EST MOD 30 MIN: CPT | Mod: S$GLB,,, | Performed by: INTERNAL MEDICINE

## 2017-10-06 RX ORDER — CILOSTAZOL 100 MG/1
100 TABLET ORAL 2 TIMES DAILY
Qty: 60 TABLET | Refills: 11 | Status: SHIPPED | OUTPATIENT
Start: 2017-10-06 | End: 2018-10-06

## 2017-10-06 NOTE — PROGRESS NOTES
Subjective:    Patient ID:  Min Cerda is a 56 y.o. female who presents for follow-up of Peripheral Arterial Disease      HPI     57 y/o female with hx of PAD suggested by arterial doppler, COPD, active tobacco use, fam hx of early CAD who presents for f/u. Last clinic visit was seen after hospitalization for ERICKSON from dehydration, improved after IVF's. Recent Nuclear SPECT normal. She continues to complain of bilateral LE weakness R>L and pain with ambulation or RLE. Arterial doppler read as mild/moderate atherosclerotic plaquing. On my review, waveforms are dampened and monophasic throughout. Continues to feel dizzy, lightheaded, and SOB. Loss of appetite and continues to lose weight. Has chronic EASLEY. Denies orthopnea, PND, syncope. Continues to smoke, but is trying to quit. Compliant with meds. Recently seen by nephrologist and w/u underway.     Review of Systems   Constitution: Positive for weakness and malaise/fatigue.   HENT: Negative for congestion.    Eyes: Negative for blurred vision.   Cardiovascular: Positive for chest pain, claudication and dyspnea on exertion. Negative for cyanosis, irregular heartbeat, leg swelling, near-syncope, orthopnea, palpitations, paroxysmal nocturnal dyspnea and syncope.   Respiratory: Positive for shortness of breath.    Endocrine: Negative for polyuria.   Hematologic/Lymphatic: Negative for bleeding problem.   Skin: Negative for itching and rash.   Musculoskeletal: Positive for muscle weakness. Negative for joint swelling and muscle cramps.   Gastrointestinal: Negative for abdominal pain, hematemesis, hematochezia, melena, nausea and vomiting.   Genitourinary: Negative for dysuria and hematuria.   Neurological: Positive for dizziness and light-headedness. Negative for focal weakness, headaches and loss of balance.   Psychiatric/Behavioral: Negative for depression. The patient is not nervous/anxious.         Objective:    Physical Exam   Constitutional: She is oriented to  person, place, and time. She appears well-developed and well-nourished.   HENT:   Head: Normocephalic and atraumatic.   Neck: Neck supple. No JVD present.   Cardiovascular: Normal rate, regular rhythm and normal heart sounds.    Pulses:       Carotid pulses are 2+ on the right side, and 2+ on the left side.       Radial pulses are 2+ on the right side, and 2+ on the left side.        Femoral pulses are 2+ on the right side, and 2+ on the left side.       Dorsalis pedis pulses are 2+ on the right side, and 2+ on the left side.        Posterior tibial pulses are 2+ on the right side, and 2+ on the left side.   Pulmonary/Chest: Effort normal and breath sounds normal.   Abdominal: Soft. Bowel sounds are normal.   Musculoskeletal: She exhibits no edema.   Neurological: She is alert and oriented to person, place, and time.   Skin: Skin is warm and dry.   Psychiatric: She has a normal mood and affect. Her behavior is normal. Thought content normal.         Assessment:       1. PAD (peripheral artery disease)    2. Chronic obstructive pulmonary disease, unspecified COPD type    3. ERICKSON (acute kidney injury)    4. Hyponatremia    5. Weakness of both lower extremities    6. EASLEY (dyspnea on exertion)      55 y/o female with hx and presentation as above. Regarding PAD, will increase cilostazol to 100 mg BID. Will delay invasive evaluation due to need for contrast and risk of MIHAI given recent ERICKSON, and will await nephrology w/u. Needs to be evaluated by PCP for other symptoms including weight loss, fatigue, anorexia, hyponatremia, hx of smoking, eval for malignancy.        Plan:       -Wait nephrology w/u  -F/u in 2 months  -Increase cilostazol to 100 mg BID

## 2017-10-19 ENCOUNTER — TELEPHONE (OUTPATIENT)
Dept: CARDIOLOGY | Facility: CLINIC | Age: 57
End: 2017-10-19

## 2017-10-19 NOTE — TELEPHONE ENCOUNTER
----- Message from Aurelia Masterson sent at 10/19/2017 12:48 PM CDT -----  Contact: [phone]/self  Patient requesting to speak with you regarding the kidney doctor you referred her to. Please call and advise.

## 2017-10-20 ENCOUNTER — TELEPHONE (OUTPATIENT)
Dept: NEPHROLOGY | Facility: CLINIC | Age: 57
End: 2017-10-20

## 2017-10-27 ENCOUNTER — TELEPHONE (OUTPATIENT)
Dept: NEPHROLOGY | Facility: CLINIC | Age: 57
End: 2017-10-27

## 2017-10-27 ENCOUNTER — TELEPHONE (OUTPATIENT)
Dept: CARDIOLOGY | Facility: CLINIC | Age: 57
End: 2017-10-27

## 2017-10-27 ENCOUNTER — HOSPITAL ENCOUNTER (OUTPATIENT)
Dept: RADIOLOGY | Facility: HOSPITAL | Age: 57
Discharge: HOME | End: 2017-10-27
Attending: INTERNAL MEDICINE
Payer: COMMERCIAL

## 2017-10-27 DIAGNOSIS — N28.89 PELVICALIECTASIS: ICD-10-CM

## 2017-10-27 DIAGNOSIS — N17.9 AKI (ACUTE KIDNEY INJURY): ICD-10-CM

## 2017-10-27 PROCEDURE — 76770 US EXAM ABDO BACK WALL COMP: CPT | Mod: TC,PO

## 2017-10-27 NOTE — TELEPHONE ENCOUNTER
----- Message from Leobardo Gross sent at 10/27/2017  2:10 PM CDT -----  Contact: [phone]/self  Pt requesting to speak with you concerning scheduling test.  Please call and advise

## 2017-10-30 ENCOUNTER — TELEPHONE (OUTPATIENT)
Dept: NEPHROLOGY | Facility: CLINIC | Age: 57
End: 2017-10-30

## 2017-10-30 RX ORDER — CIPROFLOXACIN 500 MG/1
500 TABLET ORAL EVERY 12 HOURS
Qty: 14 TABLET | Refills: 0 | Status: SHIPPED | OUTPATIENT
Start: 2017-10-30 | End: 2018-02-09 | Stop reason: ALTCHOICE

## 2017-11-20 ENCOUNTER — TELEPHONE (OUTPATIENT)
Dept: CARDIOLOGY | Facility: CLINIC | Age: 57
End: 2017-11-20

## 2017-11-20 NOTE — TELEPHONE ENCOUNTER
----- Message from Naseem Carrero sent at 11/20/2017  2:16 PM CST -----  Contact: Pt   Pt would like a call back from nurse in ref to rescheduling visit    Pt can be reached at [phone]

## 2017-11-27 RX ORDER — PREDNISONE 50 MG/1
TABLET ORAL
Qty: 3 TABLET | Refills: 0 | Status: SHIPPED | OUTPATIENT
Start: 2017-11-27 | End: 2018-02-09

## 2017-11-27 RX ORDER — ZINC GLUCONATE 13.3 MG
LOZENGE ORAL
Qty: 3 TABLET | Refills: 0 | Status: SHIPPED | OUTPATIENT
Start: 2017-11-27 | End: 2018-02-22 | Stop reason: SDUPTHER

## 2017-12-06 ENCOUNTER — HOSPITAL ENCOUNTER (OUTPATIENT)
Dept: RADIOLOGY | Facility: HOSPITAL | Age: 57
Discharge: HOME | End: 2017-12-06
Attending: INTERNAL MEDICINE
Payer: COMMERCIAL

## 2017-12-06 PROCEDURE — 75635 CT ANGIO ABDOMINAL ARTERIES: CPT | Mod: TC,PO

## 2017-12-06 PROCEDURE — 25500020 PHARM REV CODE 255: Mod: PO | Performed by: INTERNAL MEDICINE

## 2017-12-06 RX ADMIN — IOHEXOL 100 ML: 350 INJECTION, SOLUTION INTRAVENOUS at 09:12

## 2017-12-08 ENCOUNTER — OFFICE VISIT (OUTPATIENT)
Dept: CARDIOLOGY | Facility: CLINIC | Age: 57
End: 2017-12-08
Payer: COMMERCIAL

## 2017-12-08 ENCOUNTER — TELEPHONE (OUTPATIENT)
Dept: CARDIOLOGY | Facility: CLINIC | Age: 57
End: 2017-12-08

## 2017-12-08 VITALS
BODY MASS INDEX: 18.48 KG/M2 | SYSTOLIC BLOOD PRESSURE: 133 MMHG | DIASTOLIC BLOOD PRESSURE: 79 MMHG | WEIGHT: 97.88 LBS | OXYGEN SATURATION: 95 % | HEART RATE: 101 BPM | HEIGHT: 61 IN

## 2017-12-08 DIAGNOSIS — J44.9 CHRONIC OBSTRUCTIVE PULMONARY DISEASE, UNSPECIFIED COPD TYPE: ICD-10-CM

## 2017-12-08 DIAGNOSIS — F41.9 ANXIETY: ICD-10-CM

## 2017-12-08 DIAGNOSIS — N17.9 AKI (ACUTE KIDNEY INJURY): ICD-10-CM

## 2017-12-08 DIAGNOSIS — R06.09 DOE (DYSPNEA ON EXERTION): ICD-10-CM

## 2017-12-08 DIAGNOSIS — I73.9 PAD (PERIPHERAL ARTERY DISEASE): Primary | ICD-10-CM

## 2017-12-08 DIAGNOSIS — R29.898 WEAKNESS OF BOTH LOWER EXTREMITIES: ICD-10-CM

## 2017-12-08 PROCEDURE — 99214 OFFICE O/P EST MOD 30 MIN: CPT | Mod: S$GLB,,, | Performed by: INTERNAL MEDICINE

## 2017-12-08 PROCEDURE — 99999 PR PBB SHADOW E&M-EST. PATIENT-LVL III: CPT | Mod: PBBFAC,,, | Performed by: INTERNAL MEDICINE

## 2017-12-08 NOTE — PROGRESS NOTES
Subjective:    Patient ID:  Min Cerda is a 57 y.o. female who presents for follow-up of Peripheral Arterial Disease      HPI  55 y/o female with hx of PAD suggested by arterial doppler and confirmed with CTA, COPD, active tobacco use, fam hx of early CAD who presents for f/u. She was seen after hospitalization for ERICKSON from dehydration, improved after IVF's and had a Nuclear SPECT which was normal.   Last clinic visit was having worsening claudication and bilateral leg weakness. She continues to complain of bilateral LE weakness R>L and pain with ambulation or RLE. Continues to feel dizzy, lightheaded, and SOB. Loss of appetite and continues to lose weight. Has chronic EASLEY. Denies orthopnea, PND, syncope. Continues to smoke, but is trying to quit. Compliant with meds. Recently seen by nephrologist and w/u underway. CTA with advanced aortoiliac atherosclerotic disease with marked plaque and subsequent thrombosis of distal aorta, just above the bifurcation, and bilateral common iliac artery thrombosis with reconstitution of flow at bilateral common iliac artery bifurcations with trifurcation arterial systems normal.      Review of Systems   Constitution: Positive for weakness and malaise/fatigue.   HENT: Negative for congestion.    Eyes: Negative for blurred vision.   Cardiovascular: Positive for claudication and dyspnea on exertion. Negative for chest pain, cyanosis, irregular heartbeat, leg swelling, near-syncope, orthopnea, palpitations, paroxysmal nocturnal dyspnea and syncope.   Respiratory: Positive for shortness of breath.    Endocrine: Negative for polyuria.   Hematologic/Lymphatic: Negative for bleeding problem.   Skin: Negative for itching and rash.   Musculoskeletal: Positive for back pain and muscle weakness. Negative for joint swelling and muscle cramps.   Gastrointestinal: Negative for abdominal pain, hematemesis, hematochezia, melena, nausea and vomiting.   Genitourinary: Negative for dysuria and  hematuria.   Neurological: Positive for focal weakness. Negative for dizziness, headaches, light-headedness and loss of balance.   Psychiatric/Behavioral: Negative for depression. The patient is not nervous/anxious.         Objective:    Physical Exam   Constitutional: She is oriented to person, place, and time. She appears well-developed and well-nourished.   HENT:   Head: Normocephalic and atraumatic.   Neck: Neck supple. No JVD present.   Cardiovascular: Normal rate, regular rhythm and normal heart sounds.    Pulses:       Carotid pulses are 2+ on the right side, and 2+ on the left side.       Radial pulses are 2+ on the right side, and 2+ on the left side.        Femoral pulses are 2+ on the right side, and 2+ on the left side.       Posterior tibial pulses are 1+ on the right side, and 1+ on the left side.   Pulmonary/Chest: Effort normal and breath sounds normal.   Abdominal: Soft. Bowel sounds are normal.   Musculoskeletal: She exhibits no edema.   Neurological: She is alert and oriented to person, place, and time.   Skin: Skin is warm and dry.   Psychiatric: She has a normal mood and affect. Her behavior is normal. Thought content normal.         Assessment:       1. PAD (peripheral artery disease)    2. Anxiety    3. Chronic obstructive pulmonary disease, unspecified COPD type    4. ERICKSON (acute kidney injury)    5. Weakness of both lower extremities    6. EASLEY (dyspnea on exertion)      58 y/o female with hx and presentation as above. Severe PAD noted at the level of the distal aorta and bilateral iliac systems. Will plan for intervention. Will evaluate CTA and plan for intervention, likely stent graft.        Plan:       -Aorto and iliac intervention  -The procedure was discussed with the pt along with the risks/benefits and the pt voiced understanding. All questions were answered and written consents obtained.

## 2017-12-12 ENCOUNTER — TELEPHONE (OUTPATIENT)
Dept: CARDIOLOGY | Facility: CLINIC | Age: 57
End: 2017-12-12

## 2017-12-12 NOTE — TELEPHONE ENCOUNTER
----- Message from Michelle Hardy sent at 12/12/2017 12:29 PM CST -----  Contact: self/[phone]  Patient is returning your call.  Please call and advise in regard to surgery schedule.    Please call and advise.

## 2018-01-19 ENCOUNTER — TELEPHONE (OUTPATIENT)
Dept: CARDIOLOGY | Facility: CLINIC | Age: 58
End: 2018-01-19

## 2018-01-19 NOTE — TELEPHONE ENCOUNTER
----- Message from Nancy Don sent at 1/19/2018  2:15 PM CST -----  Contact: Self/ [phone]  Patient called in requesting to speak with you. Patient prefers to speak with a nurse.     Please call and advise.

## 2018-01-22 ENCOUNTER — TELEPHONE (OUTPATIENT)
Dept: CARDIOLOGY | Facility: CLINIC | Age: 58
End: 2018-01-22

## 2018-01-22 NOTE — TELEPHONE ENCOUNTER
----- Message from Zayda Szymanski sent at 1/22/2018  2:40 PM CST -----  Contact: [phone]/ self   Pt requesting to speak with you in regarding her procedure coming up  . Please advise

## 2018-01-23 ENCOUNTER — TELEPHONE (OUTPATIENT)
Dept: CARDIOLOGY | Facility: CLINIC | Age: 58
End: 2018-01-23

## 2018-01-23 NOTE — TELEPHONE ENCOUNTER
Spoke to patient, she will follow up in [facility] tomorrow 1/24.

## 2018-01-23 NOTE — TELEPHONE ENCOUNTER
----- Message from Nii Foster MD sent at 1/23/2018  9:15 AM CST -----  Contact: [phone]/ self   Please let Ms Cerda know that we are working on planning her procedure. Let her know that someone from our office will be calling her to schedule her an appointment with my partner Dr. Newby who will be performing the procedure with myself to explain what we are going to do and why.   Thanks  ROMAIN    ----- Message -----  From: Nathaly Kelly MA  Sent: 1/22/2018   4:29 PM  To: Nii Foster MD    Patient has made a second attempt to try to reach out to you to see if her procedure would be scheduled her pain is getting worse as well as the swelling.  ----- Message -----  From: Zayda Szymanski  Sent: 1/22/2018   2:40 PM  To: [facility] Staff    Pt requesting to speak with you in regarding her procedure coming up  . Please advise

## 2018-01-23 NOTE — TELEPHONE ENCOUNTER
----- Message from Nii Foster MD sent at 1/23/2018  8:18 AM CST -----  Please schedule Ms Cerda to see you soon. She is a bad claudicant and in a lot of pain and needs procedure sooner than later (Aortic stent graft).  Thanks  ROMAIN

## 2018-01-26 ENCOUNTER — OFFICE VISIT (OUTPATIENT)
Dept: CARDIOLOGY | Facility: CLINIC | Age: 58
End: 2018-01-26
Payer: COMMERCIAL

## 2018-01-26 VITALS
SYSTOLIC BLOOD PRESSURE: 141 MMHG | DIASTOLIC BLOOD PRESSURE: 84 MMHG | HEIGHT: 61 IN | OXYGEN SATURATION: 99 % | WEIGHT: 97.5 LBS | BODY MASS INDEX: 18.41 KG/M2 | HEART RATE: 118 BPM

## 2018-01-26 DIAGNOSIS — M79.601 PAIN IN BOTH UPPER EXTREMITIES: ICD-10-CM

## 2018-01-26 DIAGNOSIS — N17.9 ACUTE RENAL FAILURE, UNSPECIFIED ACUTE RENAL FAILURE TYPE: ICD-10-CM

## 2018-01-26 DIAGNOSIS — I73.9 PAD (PERIPHERAL ARTERY DISEASE): Primary | ICD-10-CM

## 2018-01-26 DIAGNOSIS — J44.9 CHRONIC OBSTRUCTIVE PULMONARY DISEASE, UNSPECIFIED COPD TYPE: ICD-10-CM

## 2018-01-26 DIAGNOSIS — R29.898 WEAKNESS OF BOTH LOWER EXTREMITIES: ICD-10-CM

## 2018-01-26 DIAGNOSIS — I73.9 CLAUDICATION: ICD-10-CM

## 2018-01-26 DIAGNOSIS — M79.602 PAIN IN BOTH UPPER EXTREMITIES: ICD-10-CM

## 2018-01-26 DIAGNOSIS — R06.09 DOE (DYSPNEA ON EXERTION): ICD-10-CM

## 2018-01-26 PROCEDURE — 99214 OFFICE O/P EST MOD 30 MIN: CPT | Mod: S$GLB,,, | Performed by: INTERNAL MEDICINE

## 2018-01-26 PROCEDURE — 3008F BODY MASS INDEX DOCD: CPT | Mod: S$GLB,,, | Performed by: INTERNAL MEDICINE

## 2018-01-26 PROCEDURE — 99999 PR PBB SHADOW E&M-EST. PATIENT-LVL III: CPT | Mod: PBBFAC,,, | Performed by: INTERNAL MEDICINE

## 2018-01-26 RX ORDER — AMLODIPINE BESYLATE 5 MG/1
5 TABLET ORAL DAILY
COMMUNITY
End: 2018-01-26 | Stop reason: SDUPTHER

## 2018-01-26 RX ORDER — AMLODIPINE BESYLATE 10 MG/1
5 TABLET ORAL DAILY
Qty: 90 TABLET | Refills: 3 | Status: SHIPPED | OUTPATIENT
Start: 2018-01-26 | End: 2018-02-09 | Stop reason: ALTCHOICE

## 2018-01-30 ENCOUNTER — HOSPITAL ENCOUNTER (OUTPATIENT)
Dept: RADIOLOGY | Facility: HOSPITAL | Age: 58
Discharge: HOME | End: 2018-01-30
Attending: INTERNAL MEDICINE
Payer: COMMERCIAL

## 2018-01-30 DIAGNOSIS — M79.601 PAIN IN BOTH UPPER EXTREMITIES: ICD-10-CM

## 2018-01-30 DIAGNOSIS — M79.602 PAIN IN BOTH UPPER EXTREMITIES: ICD-10-CM

## 2018-01-30 PROBLEM — I73.9 CLAUDICATION: Status: ACTIVE | Noted: 2018-01-30

## 2018-01-30 PROCEDURE — 93930 UPPER EXTREMITY STUDY: CPT | Mod: TC,PO

## 2018-01-30 NOTE — PROGRESS NOTES
Subjective:    Patient ID:  Min Cerda is a 57 y.o. female who presents for evaluation of Peripheral Arterial Disease and Claudication      HPI  58 y/o female with hx of PAD suggested by arterial doppler and confirmed with CTA, COPD, active tobacco use, fam hx of early CAD who presents for f/u. She was seen after hospitalization for ERICKSON from dehydration, improved after IVF's and had a Nuclear SPECT which was normal.   Last clinic visit was seen for worsening claudication, CTA with significant aorto-iliac disease, on cilostazol. She continues to have worsening bilateral LE claudication with intermittent rest pain -Bobby III. No non healing ulceration or limb ischemia. Both legs are weak and she also has been complaining of bilateral arm pain and weakness with movements. Continues to have CP which is likely non cardiac. Also has worsening SOB. Has diagnosis of COPD, has not seen Pulmonology and not on regular treatment for COPD. Denies orthopnea, PND, syncope.     Review of Systems   Constitution: Positive for weakness and malaise/fatigue.   HENT: Negative for congestion.    Eyes: Negative for blurred vision.   Cardiovascular: Positive for chest pain, claudication and dyspnea on exertion. Negative for cyanosis, irregular heartbeat, leg swelling, near-syncope, orthopnea, palpitations, paroxysmal nocturnal dyspnea and syncope.   Respiratory: Positive for shortness of breath.    Endocrine: Negative for polyuria.   Hematologic/Lymphatic: Negative for bleeding problem.   Skin: Negative for itching and rash.   Musculoskeletal: Positive for joint pain, muscle weakness and myalgias. Negative for joint swelling and muscle cramps.   Gastrointestinal: Negative for abdominal pain, hematemesis, hematochezia, melena, nausea and vomiting.   Genitourinary: Negative for dysuria and hematuria.   Neurological: Positive for light-headedness. Negative for dizziness, focal weakness, headaches and loss of balance.    Psychiatric/Behavioral: Negative for depression. The patient is not nervous/anxious.         Objective:    Physical Exam   Constitutional: She is oriented to person, place, and time. She appears well-developed and well-nourished.   HENT:   Head: Normocephalic and atraumatic.   Neck: Neck supple. No JVD present.   Cardiovascular: Normal rate, regular rhythm and normal heart sounds.    Pulses:       Carotid pulses are 2+ on the right side, and 2+ on the left side.       Radial pulses are 2+ on the right side, and 2+ on the left side.        Femoral pulses are 2+ on the right side, and 2+ on the left side.  Weak monophasic bilat DP  Monophasic bilat PT   Pulmonary/Chest: Effort normal and breath sounds normal.   Abdominal: Soft. Bowel sounds are normal.   Musculoskeletal: She exhibits no edema.   Neurological: She is alert and oriented to person, place, and time.   Skin: Skin is warm and dry.   Psychiatric: She has a normal mood and affect. Her behavior is normal. Thought content normal.         Assessment:       1. PAD (peripheral artery disease)    2. Pain in both upper extremities    3. Chronic obstructive pulmonary disease, unspecified COPD type    4. EASLEY (dyspnea on exertion)    5. Acute renal failure, unspecified acute renal failure type    6. Weakness of both lower extremities    7. Claudication      56 y/o female with hx and presentation as above. Claudication worsened and with Bobby III symptoms. CT reviewed with Dr Newby who came and spoke with pt and . Will plan for intervention with aorto-iliac stent graft.        Plan:       -Peripheral intervention with aorto-iliac stent graft  -Will plan with the next couple of weeks  -Needs to establish care with Pulmonology -referral placed  -Continue current medical management

## 2018-01-31 DIAGNOSIS — J44.9 CHRONIC OBSTRUCTIVE PULMONARY DISEASE, UNSPECIFIED COPD TYPE: Primary | ICD-10-CM

## 2018-02-01 DIAGNOSIS — I73.9 CLAUDICATION: ICD-10-CM

## 2018-02-01 DIAGNOSIS — I73.9 PAD (PERIPHERAL ARTERY DISEASE): Primary | ICD-10-CM

## 2018-02-01 RX ORDER — SODIUM CHLORIDE 9 MG/ML
3 INJECTION, SOLUTION INTRAVENOUS CONTINUOUS
Status: CANCELLED | OUTPATIENT
Start: 2018-02-01 | End: 2018-02-01

## 2018-02-01 RX ORDER — DIPHENHYDRAMINE HCL 25 MG
50 CAPSULE ORAL ONCE
Status: CANCELLED | OUTPATIENT
Start: 2018-02-01 | End: 2018-02-01

## 2018-02-09 ENCOUNTER — HOSPITAL ENCOUNTER (OUTPATIENT)
Dept: PULMONOLOGY | Facility: CLINIC | Age: 58
Discharge: HOME | End: 2018-02-09
Payer: COMMERCIAL

## 2018-02-09 ENCOUNTER — OFFICE VISIT (OUTPATIENT)
Dept: PULMONOLOGY | Facility: CLINIC | Age: 58
End: 2018-02-09
Payer: COMMERCIAL

## 2018-02-09 VITALS
SYSTOLIC BLOOD PRESSURE: 112 MMHG | DIASTOLIC BLOOD PRESSURE: 70 MMHG | HEART RATE: 104 BPM | OXYGEN SATURATION: 87 % | HEIGHT: 62 IN | BODY MASS INDEX: 17.85 KG/M2 | WEIGHT: 97 LBS

## 2018-02-09 DIAGNOSIS — J44.9 CHRONIC OBSTRUCTIVE PULMONARY DISEASE, UNSPECIFIED COPD TYPE: ICD-10-CM

## 2018-02-09 DIAGNOSIS — J44.9 CHRONIC OBSTRUCTIVE PULMONARY DISEASE, UNSPECIFIED COPD TYPE: Primary | ICD-10-CM

## 2018-02-09 DIAGNOSIS — F17.210 SMOKING GREATER THAN 40 PACK YEARS: ICD-10-CM

## 2018-02-09 DIAGNOSIS — R09.02 HYPOXIA: ICD-10-CM

## 2018-02-09 LAB
PRE FEV1 FVC: 65
PRE FEV1: 1.35
PRE FVC: 2.07
PREDICTED FEV1 FVC: 82
PREDICTED FEV1: 2.3
PREDICTED FVC: 2.84

## 2018-02-09 PROCEDURE — 3008F BODY MASS INDEX DOCD: CPT | Mod: S$GLB,,, | Performed by: NURSE PRACTITIONER

## 2018-02-09 PROCEDURE — 94010 BREATHING CAPACITY TEST: CPT | Mod: S$GLB,,, | Performed by: INTERNAL MEDICINE

## 2018-02-09 PROCEDURE — 94727 GAS DIL/WSHOT DETER LNG VOL: CPT | Mod: S$GLB,,, | Performed by: INTERNAL MEDICINE

## 2018-02-09 PROCEDURE — 99204 OFFICE O/P NEW MOD 45 MIN: CPT | Mod: 25,S$GLB,, | Performed by: NURSE PRACTITIONER

## 2018-02-09 PROCEDURE — 99999 PR PBB SHADOW E&M-EST. PATIENT-LVL IV: CPT | Mod: PBBFAC,,, | Performed by: NURSE PRACTITIONER

## 2018-02-09 PROCEDURE — 94729 DIFFUSING CAPACITY: CPT | Mod: S$GLB,,, | Performed by: INTERNAL MEDICINE

## 2018-02-09 RX ORDER — AMLODIPINE AND BENAZEPRIL HYDROCHLORIDE 10; 20 MG/1; MG/1
CAPSULE ORAL
COMMUNITY
Start: 2017-12-12 | End: 2018-06-01

## 2018-02-09 RX ORDER — DOXEPIN HYDROCHLORIDE 150 MG/1
CAPSULE ORAL
COMMUNITY
Start: 2017-12-24

## 2018-02-09 RX ORDER — TIZANIDINE 4 MG/1
TABLET ORAL
COMMUNITY
Start: 2018-02-02

## 2018-02-09 RX ORDER — FLUTICASONE PROPIONATE 220 UG/1
AEROSOL, METERED RESPIRATORY (INHALATION)
COMMUNITY
Start: 2017-12-15 | End: 2018-02-09

## 2018-02-09 RX ORDER — TOPIRAMATE 100 MG/1
TABLET, FILM COATED ORAL
COMMUNITY
Start: 2017-12-01

## 2018-02-09 RX ORDER — ALBUTEROL SULFATE 90 UG/1
2 AEROSOL, METERED RESPIRATORY (INHALATION) EVERY 6 HOURS PRN
Qty: 18 G | Refills: 6 | Status: SHIPPED | OUTPATIENT
Start: 2018-02-09 | End: 2019-02-09

## 2018-02-09 RX ORDER — FLUTICASONE FUROATE AND VILANTEROL 200; 25 UG/1; UG/1
1 POWDER RESPIRATORY (INHALATION) DAILY
Qty: 1 EACH | Refills: 6 | Status: SHIPPED | OUTPATIENT
Start: 2018-02-09

## 2018-02-09 NOTE — LETTER
February 9, 2018      Nii Foster MD  [address]  [address]  [facility] [address]           [facility]  [address]  [place] [address]  Phone: [phone]          Patient: Min Cerda   MR Number: [id number]   YOB: 1960   Date of Visit: 2/9/2018       Dear Dr. Nii Foster:    Thank you for referring Min Cerda to me for evaluation. Attached you will find relevant portions of my assessment and plan of care.    If you have questions, please do not hesitate to call me. I look forward to following Min Cerda along with you.    Sincerely,    Alejandra Donis, JACKLYN    Enclosure  CC:  No Recipients    If you would like to receive this communication electronically, please contact [email or web address] or [phone] to request more information on [employer] access.    For providers and/or their staff who would like to refer a patient to [facility], please contact us through our one-stop-shop provider referral line, Yina Wing, at [phone].    If you feel you have received this communication in error or would no longer like to receive these types of communications, please e-mail [email or web address]

## 2018-02-09 NOTE — PROGRESS NOTES
Subjective:       Patient ID: Min Cerda is a 57 y.o. female.    Chief Complaint: Shortness of Breath and COPD    HPI  Min Cerda is a 57 y.o. female, with a 44 pack year smoking history, presents to clinic for evaluation of her COPD. Ms. Cerda reports she was diagnosed with COPD approximately ten years ago. The patient reports she has attempted to quit smoking in the past, but has been unsuccessful. She is interested in more information and help to stop smoking.     The patient states she is gets short of breath with minimal activity. Shortness of breath is impacting her ADL's. In clinic today her resting pulse ox on room air is 87%. She sleeps elevated on pillows. Denies a cough. Family medical history of lung disease due to asbestos in her father and lung cancer with 2 grandparents. Patient has a past medical history of ERICKSON, PAD, anxiety and depression. States she is being followed by Cardiology and needs to have stents placed in both her arms and legs.     Patient states she has been prescribed Advair in the past but only took as needed and not daily. Has an Albuterol inhaler she uses as needed. Has never been intubated or hospitalized for COPD. Denies fever, chills, n/v/d, chest pain.     Review of patient's allergies indicates:   Allergen Reactions    Crestor [rosuvastatin] Other (See Comments)     Causes tachycardia per patient    Iodine and iodide containing products     Seroquel [quetiapine]     Tramadol     Sulfa (sulfonamide antibiotics) Palpitations     Past Medical History:   Diagnosis Date    Anxiety     Bipolar affective     COPD (chronic obstructive pulmonary disease)     Neck pain      Past Surgical History:   Procedure Laterality Date    APPENDECTOMY      CARPAL TUNNEL RELEASE       SECTION      HYSTERECTOMY      OVARIAN CYST REMOVAL      TONSILLECTOMY       Family History     Problem Relation (Age of Onset)    Anuerysm Maternal Grandmother    Arrhythmia Son    Cancer  "Son    Coarctation of the aorta Son    Heart disease Mother, Maternal Aunt, Maternal Uncle, Paternal Aunt, Paternal Uncle, Paternal Grandmother    Hypertension Mother, Son, Son    Lung cancer Father, Maternal Grandfather    No Known Problems Sister, Brother        Social History Main Topics    Smoking status: Current Every Day Smoker     Packs/day: 1.00     Types: Cigarettes    Smokeless tobacco: Never Used    Alcohol use No    Drug use: No    Sexual activity: Not on file       Review of Systems   Constitutional: Positive for fatigue. Negative for fever and chills.   HENT: Positive for congestion. Negative for sinus pressure and sore throat.    Eyes: Negative for itching.   Respiratory: Positive for shortness of breath, orthopnea, dyspnea on extertion and use of rescue inhaler. Negative for cough, sputum production, chest tightness and wheezing.    Cardiovascular: Negative for chest pain and leg swelling.   Musculoskeletal: Negative for joint swelling.   Skin: Negative for rash.   Gastrointestinal: Negative for nausea, vomiting and abdominal pain.   Neurological: Negative for dizziness.   Psychiatric/Behavioral: Negative for confusion.       Vitals:    02/09/18 1306 02/09/18 1405   BP: 112/70    Pulse: 104    SpO2: (!) 88% (!) 87%   Weight: 44 kg (97 lb)    Height: 5' 2" (1.575 m)        Objective:      Physical Exam   Constitutional: She is oriented to person, place, and time. She appears well-developed and well-nourished.   HENT:   Head: Normocephalic.   Right Ear: External ear normal.   Left Ear: External ear normal.   Nose: Nose normal.   Neck: Normal range of motion. Neck supple.   Cardiovascular: Normal rate and normal heart sounds.    Pulmonary/Chest: Normal expansion, effort normal and breath sounds normal.   Abdominal: Soft. There is no tenderness.   Musculoskeletal: Normal range of motion. She exhibits no edema.   Neurological: She is alert and oriented to person, place, and time.   Skin: Skin is warm " and dry. No rash noted.   Psychiatric: She has a normal mood and affect. Her behavior is normal.   Vitals reviewed.    Personal Diagnostic Review  PFT's 2/9/18: FVC 2.07, 74%, FEV1 1.35 59%, FEV1/FVC 65%, DLCO 5.6 30%. Mild obstruction present. Pulse ox on room air at rest 87%    Assessment:       Problem List Items Addressed This Visit        Pulmonary    Chronic obstructive pulmonary disease - Primary    Relevant Medications    fluticasone-vilanterol (BREO ELLIPTA) 200-25 mcg/dose DsDv diskus inhaler    albuterol 90 mcg/actuation inhaler    Other Relevant Orders    OXYGEN FOR HOME USE    Ambulatory referral to Smoking Cessation Program      Other Visit Diagnoses     Smoking greater than 40 pack years        Relevant Orders    Ambulatory referral to Smoking Cessation Program    Hypoxia        Relevant Orders    OXYGEN FOR HOME USE              Outpatient Encounter Prescriptions as of 2/9/2018   Medication Sig Dispense Refill    amlodipine-benazepril 10-20mg (LOTREL) 10-20 mg per capsule       aspirin 81 MG Chew Take 1 tablet (81 mg total) by mouth once daily.  0    butalbital-acetaminophen-caffeine -40 mg (FIORICET, ESGIC) -40 mg per tablet       cilostazol (PLETAL) 100 MG Tab Take 1 tablet (100 mg total) by mouth 2 (two) times daily. 60 tablet 11    cimetidine (TAGAMET HB) 200 MG tablet Take 1 tablet 13 hours, 7, hours, and 1 hour before the procedure 3 tablet 0    diazePAM (VALIUM) 5 MG tablet Take 1 tablet (5 mg total) by mouth every 12 (twelve) hours as needed (Anxiety).      doxepin (SINEQUAN) 150 MG Cap       folic acid (FOLVITE) 1 MG tablet Take 1 mg by mouth once daily.      gabapentin (NEURONTIN) 600 MG tablet       hydrocodone-acetaminophen 10-325mg (NORCO)  mg Tab Take by mouth every 4 to 6 hours as needed.      meloxicam (MOBIC) 15 MG tablet       mirtazapine (REMERON) 15 MG tablet       nitroGLYCERIN (NITROSTAT) 0.4 MG SL tablet Place 0.4 mg under the tongue every 5  (five) minutes as needed for Chest pain.      pravastatin (PRAVACHOL) 20 MG tablet Take 1 tablet (20 mg total) by mouth once daily. 30 tablet 11    promethazine (PHENERGAN) 25 MG tablet       tiZANidine (ZANAFLEX) 4 MG tablet       topiramate (TOPAMAX) 100 MG tablet       [DISCONTINUED] albuterol (PROAIR HFA) 90 mcg/actuation inhaler Inhale 2 puffs into the lungs every 6 (six) hours as needed for Wheezing.      [DISCONTINUED] FLOVENT  mcg/actuation inhaler       albuterol 90 mcg/actuation inhaler Inhale 2 puffs into the lungs every 6 (six) hours as needed for Wheezing. Rescue 18 g 6    fluticasone-vilanterol (BREO ELLIPTA) 200-25 mcg/dose DsDv diskus inhaler Inhale 1 puff into the lungs once daily. Controller 1 each 6    [DISCONTINUED] amLODIPine (NORVASC) 10 MG tablet Take 0.5 tablets (5 mg total) by mouth once daily. 90 tablet 3    [DISCONTINUED] ciprofloxacin HCl (CIPRO) 500 MG tablet Take 1 tablet (500 mg total) by mouth every 12 (twelve) hours. 14 tablet 0    [DISCONTINUED] doxepin (SINEQUAN) 100 MG capsule Take 1 capsule (100 mg total) by mouth every evening.      [DISCONTINUED] predniSONE (DELTASONE) 50 MG Tab Take one tablet 13 hours, 7 hours, and 1 hour before the procedure 3 tablet 0     No facility-administered encounter medications on file as of 2/9/2018.      Orders Placed This Encounter   Procedures    OXYGEN FOR HOME USE     Order Specific Question:   Liter Flow     Answer:   2     Order Specific Question:   Duration     Answer:   Continuous     Order Specific Question:   Qualifying SpO2:     Answer:   87     Order Specific Question:   Testing done at:     Answer:   Rest     Order Specific Question:   Route     Answer:   nasal cannula     Order Specific Question:   Portable mode:     Answer:   pulse dose acceptable     Order Specific Question:   Device     Answer:   home concentrator with portable unit     Comments:   patient would benefit from small purse sized portable  "concentrator     Order Specific Question:   Length of need (in months):     Answer:   99 mos     Order Specific Question:   Patient condition with qualifying saturation     Answer:   COPD     Order Specific Question:   Height:     Answer:   5' 2" (1.575 m)     Order Specific Question:   Weight:     Answer:   44 kg (97 lb)     Order Specific Question:   Does patient have medical equipment at home?     Answer:   none     Order Specific Question:   Alternative treatment measures have been tried or considered and deemed clinically ineffective.     Answer:   Yes    Ambulatory referral to Smoking Cessation Program     Referral Priority:   Routine     Referral Type:   Consultation     Referral Reason:   Specialty Services Required     Requested Specialty:   CTTS     Number of Visits Requested:   1     Plan:   · Breo inhaler 1 puff one time a day, to be taken every day. Brush teeth and rinse mouth after use  · Albuterol inhaler 2 puffs every 6 hours as needed for shortness of breath  · Home oxygen to be delivered. Use 2 Liters per nasal cannula continuous  · Referral smoking cessation  · Return to clinic for follow up in 2-3 months      "

## 2018-02-09 NOTE — PATIENT INSTRUCTIONS
· Breo inhaler 1 puff one time a day. Brush teeth and rinse mouth after use  · Albuterol inhaler 2 puffs every 6 hours as needed for shortness of breath  · Home oxygen to be delivered. Use 2 Liters per nasal cannula continuous  · Referral smoking cessation  · Return to clinic for follow up in 2-3 months

## 2018-02-12 ENCOUNTER — TELEPHONE (OUTPATIENT)
Dept: CARDIOLOGY | Facility: CLINIC | Age: 58
End: 2018-02-12

## 2018-02-12 NOTE — TELEPHONE ENCOUNTER
----- Message from Batsheva Simmons sent at 2/12/2018  1:33 PM CST -----  Contact: self, [phone]  Patient requests to speak with you regarding her surgery, states the pain is terrible, needs to know if her stent is in. Please advise.

## 2018-02-13 ENCOUNTER — HOSPITAL ENCOUNTER (EMERGENCY)
Facility: HOSPITAL | Age: 58
Discharge: HOME | End: 2018-02-13
Attending: EMERGENCY MEDICINE
Payer: COMMERCIAL

## 2018-02-13 VITALS
SYSTOLIC BLOOD PRESSURE: 125 MMHG | WEIGHT: 97 LBS | OXYGEN SATURATION: 95 % | BODY MASS INDEX: 18.31 KG/M2 | HEART RATE: 96 BPM | HEIGHT: 61 IN | TEMPERATURE: 98 F | RESPIRATION RATE: 17 BRPM | DIASTOLIC BLOOD PRESSURE: 59 MMHG

## 2018-02-13 DIAGNOSIS — R07.9 CHEST PAIN: ICD-10-CM

## 2018-02-13 DIAGNOSIS — R06.02 SOB (SHORTNESS OF BREATH): ICD-10-CM

## 2018-02-13 LAB
ALBUMIN SERPL BCP-MCNC: 4.6 G/DL
ALP SERPL-CCNC: 100 U/L
ALT SERPL W/O P-5'-P-CCNC: 25 U/L
ANION GAP SERPL CALC-SCNC: 13 MMOL/L
AST SERPL-CCNC: 22 U/L
BASOPHILS # BLD AUTO: 0.05 K/UL
BASOPHILS NFR BLD: 0.5 %
BILIRUB SERPL-MCNC: 0.2 MG/DL
BILIRUB UR QL STRIP: NEGATIVE
BUN SERPL-MCNC: 6 MG/DL
CALCIUM SERPL-MCNC: 9.9 MG/DL
CHLORIDE SERPL-SCNC: 107 MMOL/L
CLARITY UR REFRACT.AUTO: CLEAR
CO2 SERPL-SCNC: 28 MMOL/L
COLOR UR AUTO: YELLOW
CREAT SERPL-MCNC: 0.75 MG/DL
DIFFERENTIAL METHOD: ABNORMAL
EOSINOPHIL # BLD AUTO: 0.4 K/UL
EOSINOPHIL NFR BLD: 4 %
ERYTHROCYTE [DISTWIDTH] IN BLOOD BY AUTOMATED COUNT: 15.6 %
EST. GFR  (AFRICAN AMERICAN): >60 ML/MIN/1.73 M^2
EST. GFR  (NON AFRICAN AMERICAN): >60 ML/MIN/1.73 M^2
GLUCOSE SERPL-MCNC: 94 MG/DL
GLUCOSE UR QL STRIP: NEGATIVE
HCT VFR BLD AUTO: 43.7 %
HGB BLD-MCNC: 14.1 G/DL
HGB UR QL STRIP: NEGATIVE
KETONES UR QL STRIP: NEGATIVE
LEUKOCYTE ESTERASE UR QL STRIP: NEGATIVE
LYMPHOCYTES # BLD AUTO: 3.2 K/UL
LYMPHOCYTES NFR BLD: 31.5 %
MCH RBC QN AUTO: 30.7 PG
MCHC RBC AUTO-ENTMCNC: 32.3 G/DL
MCV RBC AUTO: 95 FL
MONOCYTES # BLD AUTO: 0.6 K/UL
MONOCYTES NFR BLD: 5.8 %
NEUTROPHILS # BLD AUTO: 5.9 K/UL
NEUTROPHILS NFR BLD: 57.7 %
NITRITE UR QL STRIP: NEGATIVE
NT-PROBNP: 26 PG/ML
PH UR STRIP: 6 [PH] (ref 5–8)
PLATELET # BLD AUTO: 333 K/UL
PMV BLD AUTO: 9 FL
POTASSIUM SERPL-SCNC: 3.9 MMOL/L
PROT SERPL-MCNC: 7.8 G/DL
PROT UR QL STRIP: NEGATIVE
RBC # BLD AUTO: 4.6 M/UL
SODIUM SERPL-SCNC: 148 MMOL/L
SP GR UR STRIP: 1.01 (ref 1–1.03)
TROPONIN I SERPL DL<=0.01 NG/ML-MCNC: <0.012 NG/ML
TROPONIN I SERPL DL<=0.01 NG/ML-MCNC: <0.012 NG/ML
URN SPEC COLLECT METH UR: NORMAL
UROBILINOGEN UR STRIP-ACNC: NEGATIVE EU/DL
WBC # BLD AUTO: 10.23 K/UL

## 2018-02-13 PROCEDURE — 96372 THER/PROPH/DIAG INJ SC/IM: CPT

## 2018-02-13 PROCEDURE — 84484 ASSAY OF TROPONIN QUANT: CPT | Mod: 91

## 2018-02-13 PROCEDURE — 96374 THER/PROPH/DIAG INJ IV PUSH: CPT

## 2018-02-13 PROCEDURE — 63600175 PHARM REV CODE 636 W HCPCS: Performed by: EMERGENCY MEDICINE

## 2018-02-13 PROCEDURE — 94760 N-INVAS EAR/PLS OXIMETRY 1: CPT

## 2018-02-13 PROCEDURE — 85025 COMPLETE CBC W/AUTO DIFF WBC: CPT

## 2018-02-13 PROCEDURE — 81003 URINALYSIS AUTO W/O SCOPE: CPT

## 2018-02-13 PROCEDURE — 94640 AIRWAY INHALATION TREATMENT: CPT

## 2018-02-13 PROCEDURE — 25000242 PHARM REV CODE 250 ALT 637 W/ HCPCS: Performed by: EMERGENCY MEDICINE

## 2018-02-13 PROCEDURE — 25000003 PHARM REV CODE 250: Performed by: EMERGENCY MEDICINE

## 2018-02-13 PROCEDURE — 93005 ELECTROCARDIOGRAM TRACING: CPT

## 2018-02-13 PROCEDURE — 80053 COMPREHEN METABOLIC PANEL: CPT

## 2018-02-13 PROCEDURE — 96375 TX/PRO/DX INJ NEW DRUG ADDON: CPT

## 2018-02-13 PROCEDURE — 99284 EMERGENCY DEPT VISIT MOD MDM: CPT | Mod: 25

## 2018-02-13 PROCEDURE — 83880 ASSAY OF NATRIURETIC PEPTIDE: CPT

## 2018-02-13 RX ORDER — CIPROFLOXACIN 500 MG/1
500 TABLET ORAL 2 TIMES DAILY
Qty: 20 TABLET | Refills: 0 | Status: SHIPPED | OUTPATIENT
Start: 2018-02-13 | End: 2018-02-23

## 2018-02-13 RX ORDER — BETAMETHASONE SODIUM PHOSPHATE AND BETAMETHASONE ACETATE 3; 3 MG/ML; MG/ML
12 INJECTION, SUSPENSION INTRA-ARTICULAR; INTRALESIONAL; INTRAMUSCULAR; SOFT TISSUE
Status: COMPLETED | OUTPATIENT
Start: 2018-02-13 | End: 2018-02-13

## 2018-02-13 RX ORDER — IPRATROPIUM BROMIDE AND ALBUTEROL SULFATE 2.5; .5 MG/3ML; MG/3ML
3 SOLUTION RESPIRATORY (INHALATION)
Status: COMPLETED | OUTPATIENT
Start: 2018-02-13 | End: 2018-02-13

## 2018-02-13 RX ORDER — DIPHENHYDRAMINE HYDROCHLORIDE 50 MG/ML
25 INJECTION INTRAMUSCULAR; INTRAVENOUS
Status: COMPLETED | OUTPATIENT
Start: 2018-02-13 | End: 2018-02-13

## 2018-02-13 RX ORDER — CIPROFLOXACIN 500 MG/1
500 TABLET ORAL
Status: COMPLETED | OUTPATIENT
Start: 2018-02-13 | End: 2018-02-13

## 2018-02-13 RX ORDER — ASPIRIN 325 MG
325 TABLET ORAL
Status: COMPLETED | OUTPATIENT
Start: 2018-02-13 | End: 2018-02-13

## 2018-02-13 RX ORDER — METOCLOPRAMIDE HYDROCHLORIDE 5 MG/ML
10 INJECTION INTRAMUSCULAR; INTRAVENOUS
Status: COMPLETED | OUTPATIENT
Start: 2018-02-13 | End: 2018-02-13

## 2018-02-13 RX ADMIN — METOCLOPRAMIDE 10 MG: 5 INJECTION, SOLUTION INTRAMUSCULAR; INTRAVENOUS at 04:02

## 2018-02-13 RX ADMIN — IPRATROPIUM BROMIDE AND ALBUTEROL SULFATE 3 ML: .5; 3 SOLUTION RESPIRATORY (INHALATION) at 04:02

## 2018-02-13 RX ADMIN — BETAMETHASONE SODIUM PHOSPHATE AND BETAMETHASONE ACETATE 12 MG: 3; 3 INJECTION, SUSPENSION INTRA-ARTICULAR; INTRALESIONAL; INTRAMUSCULAR at 04:02

## 2018-02-13 RX ADMIN — DIPHENHYDRAMINE HYDROCHLORIDE 25 MG: 50 INJECTION, SOLUTION INTRAMUSCULAR; INTRAVENOUS at 04:02

## 2018-02-13 RX ADMIN — CIPROFLOXACIN 500 MG: 500 TABLET, FILM COATED ORAL at 05:02

## 2018-02-13 RX ADMIN — ASPIRIN 325 MG ORAL TABLET 325 MG: 325 PILL ORAL at 04:02

## 2018-02-13 NOTE — ED PROVIDER NOTES
Encounter Date: 2/13/2018       History     Chief Complaint   Patient presents with    Fall     PT reports she has been falling alot lately due to pain in legs. Pt report she is suppose to have stents put in her legs. Pt also reports migrainges. Pt reports chest pain and sob also. Pt reports she is suppose to be on home O2 all the time but is fighing with her insurance company.    Leg Pain    Migraine    Shortness of Breath     Pt presents with multiple complaints. Headache, chest pain, shortness of breath, bilateral lower extremity pain.         Chest Pain   The current episode started yesterday. Duration of episode(s) is 1 day. Chest pain occurs frequently. The chest pain is unchanged. At its most intense, the chest pain is at 5/10. The chest pain is currently at 5/10. The quality of the pain is described as pressure-like. The pain does not radiate. Primary symptoms include shortness of breath. Pertinent negatives for primary symptoms include no fever, no fatigue, no syncope, no cough, no wheezing, no palpitations, no abdominal pain, no nausea, no vomiting, no dizziness and no altered mental status.   The shortness of breath began more than 2 days ago. The shortness of breath is moderate. The patient's medical history is significant for chronic lung disease.   Associated symptoms include claudication.   Pertinent negatives for associated symptoms include no diaphoresis, no lower extremity edema, no near-syncope, no numbness, no orthopnea, no paroxysmal nocturnal dyspnea and no weakness. She tried nothing for the symptoms. Risk factors include lack of exercise, post-menopausal, sedentary lifestyle, substance abuse and smoking/tobacco exposure.   Her past medical history is significant for anxiety/panic attacks, COPD and PVD.   Pertinent negatives for past medical history include no CAD, no CHF, no diabetes, no hyperlipidemia, no hypertension, no MI and no pacemaker.   Her family medical history is significant  for hyperlipidemia and hypertension.   Pertinent negatives for family medical history include: no CAD.     Review of patient's allergies indicates:   Allergen Reactions    Crestor [rosuvastatin] Other (See Comments)     Causes tachycardia per patient    Iodine and iodide containing products     Seroquel [quetiapine]     Tramadol     Sulfa (sulfonamide antibiotics) Palpitations     Past Medical History:   Diagnosis Date    Anxiety     Bipolar affective     COPD (chronic obstructive pulmonary disease)     Neck pain      Past Surgical History:   Procedure Laterality Date    APPENDECTOMY      CARPAL TUNNEL RELEASE       SECTION      HYSTERECTOMY      OVARIAN CYST REMOVAL      TONSILLECTOMY       Family History   Problem Relation Age of Onset    Heart disease Mother     Hypertension Mother     Lung cancer Father     No Known Problems Sister     No Known Problems Brother     Heart disease Maternal Aunt     Heart disease Maternal Uncle     Heart disease Paternal Aunt     Heart disease Paternal Uncle     Anuerysm Maternal Grandmother     Lung cancer Maternal Grandfather     Heart disease Paternal Grandmother     Arrhythmia Son     Cancer Son     Hypertension Son     Hypertension Son     Coarctation of the aorta Son      Social History   Substance Use Topics    Smoking status: Current Every Day Smoker     Packs/day: 1.00     Types: Cigarettes    Smokeless tobacco: Never Used    Alcohol use No     Review of Systems   Constitutional: Negative for diaphoresis, fatigue and fever.   Respiratory: Positive for shortness of breath. Negative for cough and wheezing.    Cardiovascular: Positive for chest pain and claudication. Negative for palpitations, orthopnea, syncope and near-syncope.   Gastrointestinal: Negative for abdominal pain, nausea and vomiting.   Neurological: Negative for dizziness, weakness and numbness.   All other systems reviewed and are negative.      Physical Exam      Initial Vitals [02/13/18 1446]   BP Pulse Resp Temp SpO2   (!) 154/77 (!) 115 16 98.2 °F (36.8 °C) 95 %      MAP       102.67         Physical Exam    Nursing note and vitals reviewed.  Constitutional: She appears well-developed and well-nourished.   HENT:   Head: Normocephalic and atraumatic.   Eyes: EOM are normal.   Neck: Normal range of motion. Neck supple.   Cardiovascular: Normal rate, regular rhythm, normal heart sounds and intact distal pulses.   Pulmonary/Chest:   Pt has decreased respiratory excursion   Abdominal: Soft.   Musculoskeletal: Normal range of motion.   Neurological: She is alert and oriented to person, place, and time.   Skin: Skin is warm and dry. Capillary refill takes less than 2 seconds.   Psychiatric: She has a normal mood and affect. Her behavior is normal. Judgment and thought content normal.         ED Course   Procedures  Labs Reviewed   CBC W/ AUTO DIFFERENTIAL - Abnormal; Notable for the following:        Result Value    RDW 15.6 (*)     MPV 9.0 (*)     All other components within normal limits   COMPREHENSIVE METABOLIC PANEL - Abnormal; Notable for the following:     Sodium 148 (*)     BUN, Bld 6 (*)     All other components within normal limits   TROPONIN I   NT-PRO NATRIURETIC PEPTIDE   TROPONIN I   URINALYSIS     EKG Readings: (Independently Interpreted)   Rhythm: Normal Sinus Rhythm. Heart Rate: 98. Ectopy: No Ectopy. Conduction: Normal. ST Segments: Normal ST Segments. T Waves: Normal. Clinical Impression: Normal Sinus Rhythm       X-Rays:   Independently Interpreted Readings:   Chest X-Ray: Normal heart size.  No infiltrates.  No acute abnormalities.     Medical Decision Making:   Clinical Tests:   Lab Tests: Ordered and Reviewed  Radiological Study: Ordered and Reviewed  Medical Tests: Ordered and Reviewed                   ED Course      Clinical Impression:   Diagnoses of Chest pain and SOB (shortness of breath) were pertinent to this visit.    Disposition:    Disposition: Discharged  Condition: Stable                        Chandrika Brown MD  03/01/18 9059

## 2018-02-13 NOTE — ED NOTES
Pt updated on plan of care and informed of need for 2nd troponin. Purpose of troponin and duration of wait time for redraw explained to pt. Understanding verbalized. Pt lying in bed quietly resting. NAD noted.

## 2018-02-13 NOTE — ED TRIAGE NOTES
PT reports she has been falling alot lately due to pain in legs. Pt report she is suppose to have stents put in her legs. Pt also reports migrainges. Pt reports chest pain and sob also. Pt reports she is suppose to be on home O2 all the time but is fighing with her insurance company

## 2018-02-14 NOTE — ED NOTES
Pt lying in bed quietly resting. NAD noted. Spouse at bedside. Additional warm blanket provided for pt comfort.

## 2018-02-16 NOTE — NURSING
Left call back numbers for patient to receive pre op instructions for procedure scheduled on Friday Feb. 23rd.  Will attempt to call patient again on Monday.

## 2018-02-20 ENCOUNTER — TELEPHONE (OUTPATIENT)
Dept: CARDIOLOGY | Facility: CLINIC | Age: 58
End: 2018-02-20

## 2018-02-20 NOTE — TELEPHONE ENCOUNTER
----- Message from Nancy Don sent at 2/20/2018  1:29 PM CST -----  Contact: Self/ [phone]  Patient called in to verify who will be doing her procedure.    Please call and advise.

## 2018-02-21 NOTE — NURSING
Attempted to call patient with pre op instructions.  Both call back numbers left on voice mail.  Will attempt to call patient  later on today.

## 2018-02-22 ENCOUNTER — TELEPHONE (OUTPATIENT)
Dept: CARDIOLOGY | Facility: CLINIC | Age: 58
End: 2018-02-22

## 2018-02-22 RX ORDER — PREDNISONE 50 MG/1
TABLET ORAL
Qty: 3 TABLET | Refills: 0 | Status: SHIPPED | OUTPATIENT
Start: 2018-02-22 | End: 2018-03-06

## 2018-02-22 RX ORDER — ZINC GLUCONATE 13.3 MG
LOZENGE ORAL
Qty: 3 TABLET | Refills: 0 | Status: SHIPPED | OUTPATIENT
Start: 2018-02-22 | End: 2018-03-06

## 2018-02-23 ENCOUNTER — HOSPITAL ENCOUNTER (INPATIENT)
Facility: HOSPITAL | Age: 58
LOS: 2 days | Discharge: HOME | DRG: 253 | End: 2018-02-25
Attending: INTERNAL MEDICINE | Admitting: INTERNAL MEDICINE
Payer: COMMERCIAL

## 2018-02-23 ENCOUNTER — ANESTHESIA EVENT (OUTPATIENT)
Dept: CARDIOLOGY | Facility: HOSPITAL | Age: 58
End: 2018-02-23

## 2018-02-23 DIAGNOSIS — I73.9 PAD (PERIPHERAL ARTERY DISEASE): Primary | ICD-10-CM

## 2018-02-23 DIAGNOSIS — I73.9 CLAUDICATION: ICD-10-CM

## 2018-02-23 DIAGNOSIS — J44.9 CHRONIC OBSTRUCTIVE PULMONARY DISEASE: ICD-10-CM

## 2018-02-23 LAB
ABO + RH BLD: NORMAL
ANION GAP SERPL CALC-SCNC: 10 MMOL/L
ANION GAP SERPL CALC-SCNC: 14 MMOL/L
BASOPHILS # BLD AUTO: 0.02 K/UL
BASOPHILS NFR BLD: 0.3 %
BLD GP AB SCN CELLS X3 SERPL QL: NORMAL
BUN SERPL-MCNC: 10 MG/DL
BUN SERPL-MCNC: 13 MG/DL
CALCIUM SERPL-MCNC: 8.6 MG/DL
CALCIUM SERPL-MCNC: 9.7 MG/DL
CHLORIDE SERPL-SCNC: 107 MMOL/L
CHLORIDE SERPL-SCNC: 98 MMOL/L
CO2 SERPL-SCNC: 17 MMOL/L
CO2 SERPL-SCNC: 20 MMOL/L
CREAT SERPL-MCNC: 0.8 MG/DL
CREAT SERPL-MCNC: 0.9 MG/DL
DIFFERENTIAL METHOD: ABNORMAL
EOSINOPHIL # BLD AUTO: 0 K/UL
EOSINOPHIL NFR BLD: 0 %
ERYTHROCYTE [DISTWIDTH] IN BLOOD BY AUTOMATED COUNT: 14.6 %
EST. GFR  (AFRICAN AMERICAN): >60 ML/MIN/1.73 M^2
EST. GFR  (AFRICAN AMERICAN): >60 ML/MIN/1.73 M^2
EST. GFR  (NON AFRICAN AMERICAN): >60 ML/MIN/1.73 M^2
EST. GFR  (NON AFRICAN AMERICAN): >60 ML/MIN/1.73 M^2
GLUCOSE SERPL-MCNC: 138 MG/DL
GLUCOSE SERPL-MCNC: 143 MG/DL
HCT VFR BLD AUTO: 39.8 %
HGB BLD-MCNC: 13.4 G/DL
LYMPHOCYTES # BLD AUTO: 0.9 K/UL
LYMPHOCYTES NFR BLD: 12 %
MCH RBC QN AUTO: 30.3 PG
MCHC RBC AUTO-ENTMCNC: 33.7 G/DL
MCV RBC AUTO: 90 FL
MONOCYTES # BLD AUTO: 0 K/UL
MONOCYTES NFR BLD: 0.3 %
NEUTROPHILS # BLD AUTO: 6.7 K/UL
NEUTROPHILS NFR BLD: 86.1 %
PLATELET # BLD AUTO: 273 K/UL
PMV BLD AUTO: 9.5 FL
POTASSIUM SERPL-SCNC: 4 MMOL/L
POTASSIUM SERPL-SCNC: 4.4 MMOL/L
RBC # BLD AUTO: 4.42 M/UL
SODIUM SERPL-SCNC: 132 MMOL/L
SODIUM SERPL-SCNC: 134 MMOL/L
WBC # BLD AUTO: 7.73 K/UL

## 2018-02-23 PROCEDURE — 04703DZ DILATION OF ABDOMINAL AORTA WITH INTRALUMINAL DEVICE, PERCUTANEOUS APPROACH: ICD-10-PCS | Performed by: INTERNAL MEDICINE

## 2018-02-23 PROCEDURE — B41GYZZ FLUOROSCOPY OF LEFT LOWER EXTREMITY ARTERIES USING OTHER CONTRAST: ICD-10-PCS | Performed by: INTERNAL MEDICINE

## 2018-02-23 PROCEDURE — 93005 ELECTROCARDIOGRAM TRACING: CPT

## 2018-02-23 PROCEDURE — 047H3DZ DILATION OF RIGHT EXTERNAL ILIAC ARTERY WITH INTRALUMINAL DEVICE, PERCUTANEOUS APPROACH: ICD-10-PCS | Performed by: INTERNAL MEDICINE

## 2018-02-23 PROCEDURE — 99152 MOD SED SAME PHYS/QHP 5/>YRS: CPT | Mod: ,,, | Performed by: INTERNAL MEDICINE

## 2018-02-23 PROCEDURE — 36415 COLL VENOUS BLD VENIPUNCTURE: CPT

## 2018-02-23 PROCEDURE — 25000003 PHARM REV CODE 250: Performed by: NURSE PRACTITIONER

## 2018-02-23 PROCEDURE — 27000014 CATH LAB PROCEDURE

## 2018-02-23 PROCEDURE — 63600175 PHARM REV CODE 636 W HCPCS

## 2018-02-23 PROCEDURE — 25000003 PHARM REV CODE 250: Performed by: INTERNAL MEDICINE

## 2018-02-23 PROCEDURE — 25500020 PHARM REV CODE 255

## 2018-02-23 PROCEDURE — 99152 MOD SED SAME PHYS/QHP 5/>YRS: CPT

## 2018-02-23 PROCEDURE — 20000000 HC ICU ROOM

## 2018-02-23 PROCEDURE — 047C3DZ DILATION OF RIGHT COMMON ILIAC ARTERY WITH INTRALUMINAL DEVICE, PERCUTANEOUS APPROACH: ICD-10-PCS | Performed by: INTERNAL MEDICINE

## 2018-02-23 PROCEDURE — B410YZZ FLUOROSCOPY OF ABDOMINAL AORTA USING OTHER CONTRAST: ICD-10-PCS | Performed by: INTERNAL MEDICINE

## 2018-02-23 PROCEDURE — 80048 BASIC METABOLIC PNL TOTAL CA: CPT

## 2018-02-23 PROCEDURE — 25000003 PHARM REV CODE 250

## 2018-02-23 PROCEDURE — 80048 BASIC METABOLIC PNL TOTAL CA: CPT | Mod: 91

## 2018-02-23 PROCEDURE — 047D3DZ DILATION OF LEFT COMMON ILIAC ARTERY WITH INTRALUMINAL DEVICE, PERCUTANEOUS APPROACH: ICD-10-PCS | Performed by: INTERNAL MEDICINE

## 2018-02-23 PROCEDURE — 047J3DZ DILATION OF LEFT EXTERNAL ILIAC ARTERY WITH INTRALUMINAL DEVICE, PERCUTANEOUS APPROACH: ICD-10-PCS | Performed by: INTERNAL MEDICINE

## 2018-02-23 PROCEDURE — 63600175 PHARM REV CODE 636 W HCPCS: Performed by: INTERNAL MEDICINE

## 2018-02-23 PROCEDURE — 34705 EVAC RPR A-BIILIAC NDGFT: CPT | Mod: ,,, | Performed by: INTERNAL MEDICINE

## 2018-02-23 PROCEDURE — 85025 COMPLETE CBC W/AUTO DIFF WBC: CPT

## 2018-02-23 PROCEDURE — 27200085 CATH LAB PROCEDURE

## 2018-02-23 PROCEDURE — B41FYZZ FLUOROSCOPY OF RIGHT LOWER EXTREMITY ARTERIES USING OTHER CONTRAST: ICD-10-PCS | Performed by: INTERNAL MEDICINE

## 2018-02-23 PROCEDURE — 86850 RBC ANTIBODY SCREEN: CPT

## 2018-02-23 RX ORDER — CEFAZOLIN SODIUM 1 G/3ML
1 INJECTION, POWDER, FOR SOLUTION INTRAMUSCULAR; INTRAVENOUS ONCE
Status: COMPLETED | OUTPATIENT
Start: 2018-02-23 | End: 2018-02-23

## 2018-02-23 RX ORDER — PRAVASTATIN SODIUM 20 MG/1
20 TABLET ORAL DAILY
Status: DISCONTINUED | OUTPATIENT
Start: 2018-02-23 | End: 2018-02-25 | Stop reason: HOSPADM

## 2018-02-23 RX ORDER — CLOPIDOGREL BISULFATE 75 MG/1
75 TABLET ORAL DAILY
Status: DISCONTINUED | OUTPATIENT
Start: 2018-02-23 | End: 2018-02-25 | Stop reason: HOSPADM

## 2018-02-23 RX ORDER — PROMETHAZINE HYDROCHLORIDE 25 MG/1
25 TABLET ORAL ONCE
Status: COMPLETED | OUTPATIENT
Start: 2018-02-23 | End: 2018-02-23

## 2018-02-23 RX ORDER — NAPROXEN SODIUM 220 MG/1
81 TABLET, FILM COATED ORAL DAILY
Status: DISCONTINUED | OUTPATIENT
Start: 2018-02-23 | End: 2018-02-25 | Stop reason: HOSPADM

## 2018-02-23 RX ORDER — DOXEPIN HYDROCHLORIDE 25 MG/1
150 CAPSULE ORAL NIGHTLY PRN
Status: DISCONTINUED | OUTPATIENT
Start: 2018-02-23 | End: 2018-02-23

## 2018-02-23 RX ORDER — DIPHENHYDRAMINE HCL 25 MG
50 CAPSULE ORAL ONCE
Status: DISCONTINUED | OUTPATIENT
Start: 2018-02-23 | End: 2018-02-23 | Stop reason: HOSPADM

## 2018-02-23 RX ORDER — ACETAMINOPHEN 325 MG/1
650 TABLET ORAL EVERY 4 HOURS PRN
Status: DISCONTINUED | OUTPATIENT
Start: 2018-02-23 | End: 2018-02-25 | Stop reason: HOSPADM

## 2018-02-23 RX ORDER — MIRTAZAPINE 15 MG/1
15 TABLET, FILM COATED ORAL NIGHTLY
Status: DISCONTINUED | OUTPATIENT
Start: 2018-02-23 | End: 2018-02-25 | Stop reason: HOSPADM

## 2018-02-23 RX ORDER — NITROGLYCERIN 0.4 MG/1
0.4 TABLET SUBLINGUAL EVERY 5 MIN PRN
Status: DISCONTINUED | OUTPATIENT
Start: 2018-02-23 | End: 2018-02-25 | Stop reason: HOSPADM

## 2018-02-23 RX ORDER — LIDOCAINE HCL/EPINEPHRINE/PF 2%-1:200K
1 VIAL (ML) INJECTION ONCE
Status: COMPLETED | OUTPATIENT
Start: 2018-02-23 | End: 2018-02-23

## 2018-02-23 RX ORDER — TIZANIDINE 4 MG/1
4 TABLET ORAL EVERY 8 HOURS PRN
Status: DISCONTINUED | OUTPATIENT
Start: 2018-02-23 | End: 2018-02-25 | Stop reason: HOSPADM

## 2018-02-23 RX ORDER — SODIUM CHLORIDE 9 MG/ML
INJECTION, SOLUTION INTRAVENOUS CONTINUOUS
Status: ACTIVE | OUTPATIENT
Start: 2018-02-23 | End: 2018-02-24

## 2018-02-23 RX ORDER — GABAPENTIN 300 MG/1
300 CAPSULE ORAL 2 TIMES DAILY
Status: DISCONTINUED | OUTPATIENT
Start: 2018-02-23 | End: 2018-02-25 | Stop reason: HOSPADM

## 2018-02-23 RX ORDER — HYDROMORPHONE HYDROCHLORIDE 2 MG/1
2 TABLET ORAL EVERY 4 HOURS PRN
Status: DISCONTINUED | OUTPATIENT
Start: 2018-02-23 | End: 2018-02-25 | Stop reason: HOSPADM

## 2018-02-23 RX ORDER — HYDROCODONE BITARTRATE AND ACETAMINOPHEN 10; 325 MG/1; MG/1
1 TABLET ORAL EVERY 6 HOURS PRN
Status: DISCONTINUED | OUTPATIENT
Start: 2018-02-23 | End: 2018-02-25 | Stop reason: HOSPADM

## 2018-02-23 RX ORDER — DOXEPIN HYDROCHLORIDE 10 MG/1
10 CAPSULE ORAL 3 TIMES DAILY PRN
Status: DISCONTINUED | OUTPATIENT
Start: 2018-02-23 | End: 2018-02-25 | Stop reason: HOSPADM

## 2018-02-23 RX ORDER — CEFAZOLIN SODIUM 1 G/3ML
1 INJECTION, POWDER, FOR SOLUTION INTRAMUSCULAR; INTRAVENOUS ONCE
Status: COMPLETED | OUTPATIENT
Start: 2018-02-24 | End: 2018-02-24

## 2018-02-23 RX ORDER — DIAZEPAM 5 MG/1
5 TABLET ORAL EVERY 12 HOURS PRN
Status: DISCONTINUED | OUTPATIENT
Start: 2018-02-23 | End: 2018-02-25 | Stop reason: HOSPADM

## 2018-02-23 RX ORDER — SODIUM CHLORIDE 9 MG/ML
3 INJECTION, SOLUTION INTRAVENOUS CONTINUOUS
Status: ACTIVE | OUTPATIENT
Start: 2018-02-23 | End: 2018-02-23

## 2018-02-23 RX ORDER — CILOSTAZOL 50 MG/1
100 TABLET ORAL 2 TIMES DAILY
Status: DISCONTINUED | OUTPATIENT
Start: 2018-02-23 | End: 2018-02-25 | Stop reason: HOSPADM

## 2018-02-23 RX ADMIN — MIRTAZAPINE 15 MG: 15 TABLET, FILM COATED ORAL at 09:02

## 2018-02-23 RX ADMIN — CEFAZOLIN 1 G: 330 INJECTION, POWDER, FOR SOLUTION INTRAMUSCULAR; INTRAVENOUS at 09:02

## 2018-02-23 RX ADMIN — HYDROMORPHONE HYDROCHLORIDE 2 MG: 2 TABLET ORAL at 06:02

## 2018-02-23 RX ADMIN — HYDROCODONE BITARTRATE AND ACETAMINOPHEN 1 TABLET: 10; 325 TABLET ORAL at 11:02

## 2018-02-23 RX ADMIN — ASPIRIN 81 MG 81 MG: 81 TABLET ORAL at 01:02

## 2018-02-23 RX ADMIN — GABAPENTIN 300 MG: 300 CAPSULE ORAL at 09:02

## 2018-02-23 RX ADMIN — CILOSTAZOL 100 MG: 50 TABLET ORAL at 09:02

## 2018-02-23 RX ADMIN — SODIUM CHLORIDE: 0.9 INJECTION, SOLUTION INTRAVENOUS at 01:02

## 2018-02-23 RX ADMIN — DIAZEPAM 5 MG: 5 TABLET ORAL at 03:02

## 2018-02-23 RX ADMIN — SODIUM CHLORIDE 3 ML/KG/HR: 0.9 INJECTION, SOLUTION INTRAVENOUS at 08:02

## 2018-02-23 RX ADMIN — LIDOCAINE HYDROCHLORIDE,EPINEPHRINE BITARTRATE 1 ML: 20; .005 INJECTION, SOLUTION EPIDURAL; INFILTRATION; INTRACAUDAL; PERINEURAL at 02:02

## 2018-02-23 RX ADMIN — PRAVASTATIN SODIUM 20 MG: 40 TABLET ORAL at 03:02

## 2018-02-23 RX ADMIN — CLOPIDOGREL BISULFATE 75 MG: 75 TABLET ORAL at 05:02

## 2018-02-23 RX ADMIN — PROMETHAZINE HYDROCHLORIDE 25 MG: 25 TABLET ORAL at 03:02

## 2018-02-23 RX ADMIN — TIZANIDINE 4 MG: 4 TABLET ORAL at 05:02

## 2018-02-23 RX ADMIN — HYDROMORPHONE HYDROCHLORIDE 2 MG: 2 TABLET ORAL at 01:02

## 2018-02-23 NOTE — PLAN OF CARE
Pt reports she lives with her spouse and was independent ; pt voices no discharge needs at this time.       02/23/18 1352   Discharge Assessment   Assessment Type Discharge Planning Assessment   Confirmed/corrected address and phone number on facesheet? Yes   Assessment information obtained from? Patient   Expected Length of Stay (days) 2   Communicated expected length of stay with patient/caregiver yes   Prior to hospitilization cognitive status: Alert/Oriented   Prior to hospitalization functional status: Independent   Current cognitive status: Alert/Oriented   Current Functional Status: Needs Assistance   Lives With spouse;child(adamaris), adult   Able to Return to Prior Arrangements yes   Is patient able to care for self after discharge? Yes   Who are your caregiver(s) and their phone number(s)? Kwabena (spouse) [phone]   Patient's perception of discharge disposition home or selfcare   Readmission Within The Last 30 Days no previous admission in last 30 days   Patient currently being followed by outpatient case management? No   Patient currently receives any other outside agency services? No   Equipment Currently Used at Home cane, straight   Do you have any problems affording any of your prescribed medications? No   Is the patient taking medications as prescribed? yes   Does the patient have transportation home? Yes  (spouse)   Transportation Available family or friend will provide   Does the patient receive services at the [facility]? No   Discharge Plan A Home with family   Discharge Plan B Home with family;Home Health   Patient/Family In Agreement With Plan yes

## 2018-02-23 NOTE — PROGRESS NOTES
Arrived from cath lab w/ RN @ bedside. TR band noted to R wrist. Radial pulse palpable. Bilat groin sites noted. Dressing CDI. No redness or hematoma noted to either site. Complains of 8/10 pain to L groin. Site soft. Pulses dopplerable. See charted vitals. Will continue to monitor.

## 2018-02-23 NOTE — PROCEDURES
Post Procedure Note: ABDOMINAL AORTIC/ILIAC ENDOVASCULAR GRAFT STENT REPAIR    The pt was brought to the cath lab and under sterile technique, right radial, bilateral CFA access was obtained without difficulty under US guidance. Images were obtained in multiple views and successful placement of 22 x 60 AFX2 bifurcated device with bilateral libs (13 x 40) with bilateral EIA self expanding 9 x 40 and 9 x 30 stents. Please see full report for details. The pt tolerated the procedure well without complications. Preclose used on RCFA, perclose on LCFA and radial band devices used with successful hemostasis.     Vitals:    02/23/18 1445 02/23/18 1500 02/23/18 1515 02/23/18 1530   BP: (!) 170/89 (!) 152/71 (!) 145/68 (!) 141/74   BP Location:       Patient Position:       Pulse: 107 105 98 97   Resp: (!) 26 (!) 22 (!) 31 16   Temp:    98.3 °F (36.8 °C)   TempSrc:    Oral   SpO2: 99% 100% 98% 98%   Weight:       Height:             Gen: NAD  Ext: 2+ fem/radial pulses, no evidence of hematoma    Plan:  -Post cath care per protocol  -ASA/statin  -Plavix x 1 month

## 2018-02-24 LAB
ANION GAP SERPL CALC-SCNC: 10 MMOL/L
BASOPHILS # BLD AUTO: 0.02 K/UL
BASOPHILS NFR BLD: 0.1 %
BUN SERPL-MCNC: 12 MG/DL
CALCIUM SERPL-MCNC: 8.4 MG/DL
CHLORIDE SERPL-SCNC: 108 MMOL/L
CO2 SERPL-SCNC: 23 MMOL/L
CREAT SERPL-MCNC: 0.7 MG/DL
DIFFERENTIAL METHOD: ABNORMAL
EOSINOPHIL # BLD AUTO: 0 K/UL
EOSINOPHIL NFR BLD: 0.1 %
ERYTHROCYTE [DISTWIDTH] IN BLOOD BY AUTOMATED COUNT: 15.5 %
EST. GFR  (AFRICAN AMERICAN): >60 ML/MIN/1.73 M^2
EST. GFR  (NON AFRICAN AMERICAN): >60 ML/MIN/1.73 M^2
GLUCOSE SERPL-MCNC: 113 MG/DL
HCT VFR BLD AUTO: 32 %
HGB BLD-MCNC: 10.5 G/DL
LYMPHOCYTES # BLD AUTO: 3.2 K/UL
LYMPHOCYTES NFR BLD: 13.5 %
MCH RBC QN AUTO: 30.4 PG
MCHC RBC AUTO-ENTMCNC: 32.8 G/DL
MCV RBC AUTO: 93 FL
MONOCYTES # BLD AUTO: 2.2 K/UL
MONOCYTES NFR BLD: 9.1 %
NEUTROPHILS # BLD AUTO: 18.3 K/UL
NEUTROPHILS NFR BLD: 76.7 %
PLATELET # BLD AUTO: 266 K/UL
PLATELET BLD QL SMEAR: ABNORMAL
PMV BLD AUTO: 9.5 FL
POTASSIUM SERPL-SCNC: 3.5 MMOL/L
RBC # BLD AUTO: 3.45 M/UL
SODIUM SERPL-SCNC: 141 MMOL/L
WBC # BLD AUTO: 23.85 K/UL

## 2018-02-24 PROCEDURE — 25000003 PHARM REV CODE 250: Performed by: INTERNAL MEDICINE

## 2018-02-24 PROCEDURE — 94761 N-INVAS EAR/PLS OXIMETRY MLT: CPT

## 2018-02-24 PROCEDURE — 85025 COMPLETE CBC W/AUTO DIFF WBC: CPT

## 2018-02-24 PROCEDURE — 99233 SBSQ HOSP IP/OBS HIGH 50: CPT | Mod: ,,, | Performed by: INTERNAL MEDICINE

## 2018-02-24 PROCEDURE — 80048 BASIC METABOLIC PNL TOTAL CA: CPT

## 2018-02-24 PROCEDURE — 36415 COLL VENOUS BLD VENIPUNCTURE: CPT

## 2018-02-24 PROCEDURE — 11000001 HC ACUTE MED/SURG PRIVATE ROOM

## 2018-02-24 PROCEDURE — 63600175 PHARM REV CODE 636 W HCPCS: Performed by: INTERNAL MEDICINE

## 2018-02-24 RX ADMIN — DIAZEPAM 5 MG: 5 TABLET ORAL at 07:02

## 2018-02-24 RX ADMIN — HYDROCODONE BITARTRATE AND ACETAMINOPHEN 1 TABLET: 10; 325 TABLET ORAL at 08:02

## 2018-02-24 RX ADMIN — GABAPENTIN 300 MG: 300 CAPSULE ORAL at 08:02

## 2018-02-24 RX ADMIN — HYDROCODONE BITARTRATE AND ACETAMINOPHEN 1 TABLET: 10; 325 TABLET ORAL at 05:02

## 2018-02-24 RX ADMIN — CILOSTAZOL 100 MG: 50 TABLET ORAL at 08:02

## 2018-02-24 RX ADMIN — ASPIRIN 81 MG 81 MG: 81 TABLET ORAL at 08:02

## 2018-02-24 RX ADMIN — CLOPIDOGREL BISULFATE 75 MG: 75 TABLET ORAL at 08:02

## 2018-02-24 RX ADMIN — SODIUM CHLORIDE: 0.9 INJECTION, SOLUTION INTRAVENOUS at 01:02

## 2018-02-24 RX ADMIN — PRAVASTATIN SODIUM 20 MG: 40 TABLET ORAL at 08:02

## 2018-02-24 RX ADMIN — DOXEPIN HYDROCHLORIDE 10 MG: 10 CAPSULE ORAL at 11:02

## 2018-02-24 RX ADMIN — TIZANIDINE 4 MG: 4 TABLET ORAL at 05:02

## 2018-02-24 RX ADMIN — HYDROMORPHONE HYDROCHLORIDE 2 MG: 2 TABLET ORAL at 01:02

## 2018-02-24 RX ADMIN — DOXEPIN HYDROCHLORIDE 10 MG: 10 CAPSULE ORAL at 01:02

## 2018-02-24 RX ADMIN — HYDROMORPHONE HYDROCHLORIDE 2 MG: 2 TABLET ORAL at 02:02

## 2018-02-24 RX ADMIN — DIAZEPAM 5 MG: 5 TABLET ORAL at 09:02

## 2018-02-24 RX ADMIN — HYDROMORPHONE HYDROCHLORIDE 2 MG: 2 TABLET ORAL at 09:02

## 2018-02-24 RX ADMIN — CEFAZOLIN 1 G: 330 INJECTION, POWDER, FOR SOLUTION INTRAMUSCULAR; INTRAVENOUS at 05:02

## 2018-02-24 RX ADMIN — POTASSIUM BICARBONATE 50 MEQ: 25 TABLET, EFFERVESCENT ORAL at 05:02

## 2018-02-24 RX ADMIN — MIRTAZAPINE 15 MG: 15 TABLET, FILM COATED ORAL at 08:02

## 2018-02-24 NOTE — PROGRESS NOTES
[facility]  Cardiology  Progress Note    Patient Name: Min Cerda  MRN: [id number]  Admission Date: 2/23/2018  Hospital Length of Stay: 1 days  Code Status: Prior   Attending Physician: Nii Foster MD   Primary Care Physician: Carmen Wright MD  Expected Discharge Date:   Principal Problem:PAD (peripheral artery disease)    Subjective:     Hospital Course:   Overnight no acute events. S/p aorto-iliac repair with stent graft. No hematoma. H/H lower but stable. Hemodynamically stable.     Review of Systems   Constitution: Positive for weakness and malaise/fatigue.   HENT: Negative for congestion.    Eyes: Negative for blurred vision.   Cardiovascular: Negative for chest pain, claudication, cyanosis, dyspnea on exertion, irregular heartbeat, leg swelling, near-syncope, orthopnea, palpitations, paroxysmal nocturnal dyspnea and syncope.   Respiratory: Negative for shortness of breath.    Endocrine: Negative for polyuria.   Hematologic/Lymphatic: Negative for bleeding problem.   Skin: Negative for itching and rash.   Musculoskeletal: Positive for back pain, joint pain and muscle weakness. Negative for joint swelling and muscle cramps.   Gastrointestinal: Negative for abdominal pain, hematemesis, hematochezia, melena, nausea and vomiting.   Genitourinary: Negative for dysuria and hematuria.   Neurological: Negative for dizziness, focal weakness, headaches, light-headedness and loss of balance.   Psychiatric/Behavioral: Negative for depression. The patient is not nervous/anxious.      Objective:     Vital Signs (Most Recent):  Temp: 97.7 °F (36.5 °C) (02/24/18 1115)  Pulse: (!) 113 (02/24/18 1200)  Resp: (!) 27 (02/24/18 0923)  BP: (!) 176/109 (02/24/18 1200)  SpO2: 96 % (02/24/18 1200) Vital Signs (24h Range):  Temp:  [97.1 °F (36.2 °C)-98.5 °F (36.9 °C)] 97.7 °F (36.5 °C)  Pulse:  [] 113  Resp:  [15-47] 27  SpO2:  [92 %-99 %] 96 %  BP: ()/() 176/109     Weight: 47.3 kg (104 lb  4.4 oz)  Body mass index is 19.7 kg/m².    SpO2: 96 %  O2 Device (Oxygen Therapy): room air      Intake/Output Summary (Last 24 hours) at 02/24/18 1606  Last data filed at 02/24/18 0526   Gross per 24 hour   Intake          1151.25 ml   Output             1035 ml   Net           116.25 ml       Lines/Drains/Airways     Peripheral Intravenous Line                 Peripheral IV - Single Lumen 02/23/18 0812 Left Forearm 1 day                Physical Exam   Constitutional: She is oriented to person, place, and time. She appears well-developed and well-nourished.   HENT:   Head: Normocephalic and atraumatic.   Neck: Neck supple. No JVD present.   Cardiovascular: Normal rate, regular rhythm and normal heart sounds.    Pulses:       Carotid pulses are 2+ on the right side, and 2+ on the left side.       Radial pulses are 2+ on the right side, and 2+ on the left side.        Femoral pulses are 2+ on the right side, and 2+ on the left side.       Posterior tibial pulses are 1+ on the right side, and 1+ on the left side.   Pulmonary/Chest: Effort normal and breath sounds normal.   Abdominal: Soft. Bowel sounds are normal.   Musculoskeletal: She exhibits no edema.   Neurological: She is alert and oriented to person, place, and time.   Skin: Skin is warm and dry.   Psychiatric: She has a normal mood and affect. Her behavior is normal. Thought content normal.       Significant Labs:   BMP:   Recent Labs  Lab 02/23/18  0703 02/23/18 2024 02/24/18  0459   * 138* 113*   * 134* 141   K 4.0 4.4 3.5   CL 98 107 108   CO2 20* 17* 23   BUN 10 13 12   CREATININE 0.9 0.8 0.7   CALCIUM 9.7 8.6* 8.4*   , CMP   Recent Labs  Lab 02/23/18  0703 02/23/18 2024 02/24/18  0459   * 134* 141   K 4.0 4.4 3.5   CL 98 107 108   CO2 20* 17* 23   * 138* 113*   BUN 10 13 12   CREATININE 0.9 0.8 0.7   CALCIUM 9.7 8.6* 8.4*   ANIONGAP 14 10 10   ESTGFRAFRICA >60 >60 >60   EGFRNONAA >60 >60 >60   , CBC   Recent Labs  Lab  02/23/18  0703 02/24/18  0459   WBC 7.73 23.85*   HGB 13.4 10.5*   HCT 39.8 32.0*    266   , INR No results for input(s): INR, PROTIME in the last 48 hours., Lipid Panel No results for input(s): CHOL, HDL, LDLCALC, TRIG, CHOLHDL in the last 48 hours. and Troponin No results for input(s): TROPONINI in the last 48 hours.      Assessment and Plan:         Active Diagnoses:    Diagnosis Date Noted POA    PRINCIPAL PROBLEM:  PAD (peripheral artery disease) [I73.9] 08/29/2017 Yes    Claudication [I73.9] 01/30/2018 Yes    Weakness of both lower extremities [R29.898] 07/11/2017 Yes    EASLEY (dyspnea on exertion) [R06.09] 07/11/2017 Yes    Chronic obstructive pulmonary disease [J44.9] 07/11/2017 Yes    Anxiety [F41.9] 07/11/2017 Yes      Problems Resolved During this Admission:    Diagnosis Date Noted Date Resolved POA     PAD - s/p endovascular repair and doing well. Step down today.     VTE Risk Mitigation     None          Nii Foster MD  Cardiology  [facility]

## 2018-02-24 NOTE — NURSING
Patient arrived to unit in wheelchair with all patient belongings and ICU RN. Tele monitor placed on patient reading sinus tach with HR from 100-103. VSS. IV clean, dry, and intact. Previous cath lab procedure access sites all clean, dry, and intact with no swelling or hematomas. Bed alarm on, bed locked and low, side rails up x2, and call bell in reach.

## 2018-02-24 NOTE — PLAN OF CARE
Problem: Patient Care Overview  Goal: Plan of Care Review  Pt resting in bed at this time. AAOx4. Calm resting comfortably. Pt did not sleep at all this shift despite pain medication, anxiety medication, and sleeping medication being given. Ambulated to bedside commode multiple times without complication. All cath lab access sites without bleeding. No chest pain. Continuous monitoring maintained. Vitals stable. Pt updated on plan of care.

## 2018-02-25 VITALS
RESPIRATION RATE: 20 BRPM | HEIGHT: 61 IN | DIASTOLIC BLOOD PRESSURE: 69 MMHG | OXYGEN SATURATION: 93 % | WEIGHT: 104.25 LBS | SYSTOLIC BLOOD PRESSURE: 143 MMHG | HEART RATE: 114 BPM | BODY MASS INDEX: 19.68 KG/M2 | TEMPERATURE: 100 F

## 2018-02-25 LAB
ANION GAP SERPL CALC-SCNC: 6 MMOL/L
BASOPHILS # BLD AUTO: 0.03 K/UL
BASOPHILS NFR BLD: 0.2 %
BUN SERPL-MCNC: 8 MG/DL
CALCIUM SERPL-MCNC: 8.6 MG/DL
CHLORIDE SERPL-SCNC: 102 MMOL/L
CO2 SERPL-SCNC: 29 MMOL/L
CREAT SERPL-MCNC: 0.7 MG/DL
DIFFERENTIAL METHOD: ABNORMAL
EOSINOPHIL # BLD AUTO: 0.2 K/UL
EOSINOPHIL NFR BLD: 1.5 %
ERYTHROCYTE [DISTWIDTH] IN BLOOD BY AUTOMATED COUNT: 15.5 %
EST. GFR  (AFRICAN AMERICAN): >60 ML/MIN/1.73 M^2
EST. GFR  (NON AFRICAN AMERICAN): >60 ML/MIN/1.73 M^2
GLUCOSE SERPL-MCNC: 87 MG/DL
HCT VFR BLD AUTO: 30.1 %
HGB BLD-MCNC: 9.8 G/DL
LYMPHOCYTES # BLD AUTO: 4.3 K/UL
LYMPHOCYTES NFR BLD: 31.5 %
MCH RBC QN AUTO: 30.4 PG
MCHC RBC AUTO-ENTMCNC: 32.6 G/DL
MCV RBC AUTO: 94 FL
MONOCYTES # BLD AUTO: 1.5 K/UL
MONOCYTES NFR BLD: 11.3 %
NEUTROPHILS # BLD AUTO: 7.5 K/UL
NEUTROPHILS NFR BLD: 55.1 %
PLATELET # BLD AUTO: 235 K/UL
PMV BLD AUTO: 9.7 FL
POTASSIUM SERPL-SCNC: 4.4 MMOL/L
RBC # BLD AUTO: 3.22 M/UL
SODIUM SERPL-SCNC: 137 MMOL/L
WBC # BLD AUTO: 13.57 K/UL

## 2018-02-25 PROCEDURE — 85025 COMPLETE CBC W/AUTO DIFF WBC: CPT

## 2018-02-25 PROCEDURE — 99239 HOSP IP/OBS DSCHRG MGMT >30: CPT | Mod: ,,, | Performed by: INTERNAL MEDICINE

## 2018-02-25 PROCEDURE — 94761 N-INVAS EAR/PLS OXIMETRY MLT: CPT

## 2018-02-25 PROCEDURE — 80048 BASIC METABOLIC PNL TOTAL CA: CPT

## 2018-02-25 PROCEDURE — 25000003 PHARM REV CODE 250: Performed by: INTERNAL MEDICINE

## 2018-02-25 PROCEDURE — 36415 COLL VENOUS BLD VENIPUNCTURE: CPT

## 2018-02-25 RX ORDER — CLOPIDOGREL BISULFATE 75 MG/1
75 TABLET ORAL DAILY
Qty: 30 TABLET | Refills: 0 | Status: SHIPPED | OUTPATIENT
Start: 2018-02-26 | End: 2018-03-22 | Stop reason: SDUPTHER

## 2018-02-25 RX ADMIN — TIZANIDINE 4 MG: 4 TABLET ORAL at 01:02

## 2018-02-25 RX ADMIN — GABAPENTIN 300 MG: 300 CAPSULE ORAL at 09:02

## 2018-02-25 RX ADMIN — DIAZEPAM 5 MG: 5 TABLET ORAL at 10:02

## 2018-02-25 RX ADMIN — PRAVASTATIN SODIUM 20 MG: 40 TABLET ORAL at 09:02

## 2018-02-25 RX ADMIN — HYDROCODONE BITARTRATE AND ACETAMINOPHEN 1 TABLET: 10; 325 TABLET ORAL at 09:02

## 2018-02-25 RX ADMIN — ACETAMINOPHEN 650 MG: 325 TABLET ORAL at 09:02

## 2018-02-25 RX ADMIN — CLOPIDOGREL BISULFATE 75 MG: 75 TABLET ORAL at 09:02

## 2018-02-25 RX ADMIN — CILOSTAZOL 100 MG: 50 TABLET ORAL at 09:02

## 2018-02-25 RX ADMIN — ASPIRIN 81 MG 81 MG: 81 TABLET ORAL at 09:02

## 2018-02-25 NOTE — DISCHARGE INSTRUCTIONS
Clopidogrel Bisulfate Oral tablet (English) View Edit Remove  Cardiac Catheterization, Discharge Instructions for (English) View Edit Remove  Cardiac Catheterization, Bleeding or Hematoma After (English) View Edit Remove

## 2018-02-25 NOTE — PLAN OF CARE
Problem: Patient Care Overview  Goal: Plan of Care Review  Outcome: Ongoing (interventions implemented as appropriate)  POC reviewed with patient. Right wrist dry intact both groin bilaterally dry, intact. Reviewed signs of bleeding on sites, patient verbalized understanding. Telemetry NSR HR 90's 8581 safety maintained call bell in reach.

## 2018-02-25 NOTE — PLAN OF CARE
Problem: Patient Care Overview  Goal: Plan of Care Review  Outcome: Ongoing (interventions implemented as appropriate)  Pt received on RA.  SPO2  93%.  Pt in no apparent respiratory distress.  Will continue to monitor.

## 2018-02-25 NOTE — PLAN OF CARE
Discharge orders noted, no HH or HME ordered.    Future Appointments  Date Time Provider Department Center   2/27/2018 10:20 AM Nii Foster MD [facility]       Pt's nurse will go over medications/signs and symptoms prior to discharge       02/25/18 1135   Final Note   Assessment Type Final Discharge Note   Discharge Disposition Home   What phone number can be called within the next 1-3 days to see how you are doing after discharge? [phone]   Hospital Follow Up  Appt(s) scheduled? No  (offices closed on weekend, patient to schedule own follow up appointment)   Right Care Referral Info   Post Acute Recommendation No Care     Jill Navas, RN Transitional Navigator  [phone]

## 2018-02-26 DIAGNOSIS — Z01.818 PRE-OP EVALUATION: Primary | ICD-10-CM

## 2018-02-26 NOTE — NURSING
CASE MANAGEMENT NOTE  PATIENT CONCERN ABOUT PLAVIX AND PLETAL, SO I CALLED THE [facility]INE [facility] IN [facility] AND THE PHARMACY STATES THE MEDS ARE READY AT THE COST OF $15 DOLLARS FOR BOTH. I CALLED THE PATIENT AND HER  AND TOLD THEM THAT I CAN  THE MEDS AND THE IMPORTANCE OF GETTING THE MEDS. I ALSO OFFERED ASSISTANCE BUT THE PATIENT'S  ASSURE ME HE HAD THE MONEY TO GET THE MEDS.

## 2018-02-26 NOTE — NURSING
CASE MANAGEMENT UPDATE  FOLLOW UP CALL SPOKE TO PATIENT AND  POST DISCHARGE. PATIENT STATES EVERYTHING GOING GOOD, NO PROBLEMS. PATIENT ENCOURAGE NOT TO SMOKE. PATIENTSTATES SHE HAS NOT BEEN SMOKING. I ASKED ABOUT THE PLAVIX, PATIENTSTATES HER  WILL GET IT TODAY. PATIENT ASK ABOUT HER APPT GETTING CHANGED TO A Friday ONLY. I CALLED DR. BEGUM OFFICE AND  GOT THE APPT FOR MARCH 6  IN [place], THEY WILL TRY TO MAKE THAT ONE. PATIENT WAS ALSO PLACED ON WAITING LIST FOR A Friday APPT.

## 2018-02-28 ENCOUNTER — HOSPITAL ENCOUNTER (EMERGENCY)
Facility: HOSPITAL | Age: 58
Discharge: HOME | End: 2018-02-28
Attending: SURGERY
Payer: COMMERCIAL

## 2018-02-28 VITALS
OXYGEN SATURATION: 97 % | SYSTOLIC BLOOD PRESSURE: 152 MMHG | BODY MASS INDEX: 18.31 KG/M2 | WEIGHT: 97 LBS | RESPIRATION RATE: 20 BRPM | HEART RATE: 97 BPM | HEIGHT: 61 IN | TEMPERATURE: 98 F | DIASTOLIC BLOOD PRESSURE: 71 MMHG

## 2018-02-28 DIAGNOSIS — Z86.79 STATUS POST ABDOMINAL AORTIC ANEURYSM REPAIR: ICD-10-CM

## 2018-02-28 DIAGNOSIS — K52.9 GASTROENTERITIS: ICD-10-CM

## 2018-02-28 DIAGNOSIS — I10 ESSENTIAL HYPERTENSION: ICD-10-CM

## 2018-02-28 DIAGNOSIS — R11.15 NON-INTRACTABLE CYCLICAL VOMITING WITHOUT NAUSEA: Primary | ICD-10-CM

## 2018-02-28 DIAGNOSIS — Z98.890 STATUS POST ABDOMINAL AORTIC ANEURYSM REPAIR: ICD-10-CM

## 2018-02-28 LAB
ALBUMIN SERPL BCP-MCNC: 4.8 G/DL
ALP SERPL-CCNC: 102 U/L
ALT SERPL W/O P-5'-P-CCNC: 18 U/L
ANION GAP SERPL CALC-SCNC: 18 MMOL/L
AST SERPL-CCNC: 17 U/L
BACTERIA #/AREA URNS AUTO: ABNORMAL /HPF
BASOPHILS # BLD AUTO: 0.03 K/UL
BASOPHILS NFR BLD: 0.2 %
BILIRUB SERPL-MCNC: 0.4 MG/DL
BILIRUB UR QL STRIP: NEGATIVE
BUN SERPL-MCNC: 10 MG/DL
CALCIUM SERPL-MCNC: 9.8 MG/DL
CHLORIDE SERPL-SCNC: 98 MMOL/L
CLARITY UR REFRACT.AUTO: CLEAR
CO2 SERPL-SCNC: 24 MMOL/L
COLOR UR AUTO: YELLOW
CREAT SERPL-MCNC: 0.64 MG/DL
DIFFERENTIAL METHOD: ABNORMAL
EOSINOPHIL # BLD AUTO: 0.1 K/UL
EOSINOPHIL NFR BLD: 0.4 %
ERYTHROCYTE [DISTWIDTH] IN BLOOD BY AUTOMATED COUNT: 14.7 %
EST. GFR  (AFRICAN AMERICAN): >60 ML/MIN/1.73 M^2
EST. GFR  (NON AFRICAN AMERICAN): >60 ML/MIN/1.73 M^2
GLUCOSE SERPL-MCNC: 131 MG/DL
GLUCOSE UR QL STRIP: NEGATIVE
HCT VFR BLD AUTO: 36.1 %
HGB BLD-MCNC: 12.1 G/DL
HGB UR QL STRIP: ABNORMAL
HYALINE CASTS UR QL AUTO: 0 /LPF
INR PPP: 1.2
KETONES UR QL STRIP: NEGATIVE
LEUKOCYTE ESTERASE UR QL STRIP: ABNORMAL
LIPASE SERPL-CCNC: 14 U/L
LYMPHOCYTES # BLD AUTO: 1.3 K/UL
LYMPHOCYTES NFR BLD: 8.2 %
MCH RBC QN AUTO: 30.6 PG
MCHC RBC AUTO-ENTMCNC: 33.5 G/DL
MCV RBC AUTO: 91 FL
MICROSCOPIC COMMENT: ABNORMAL
MONOCYTES # BLD AUTO: 1.1 K/UL
MONOCYTES NFR BLD: 6.9 %
NEUTROPHILS # BLD AUTO: 13.7 K/UL
NEUTROPHILS NFR BLD: 83.9 %
NITRITE UR QL STRIP: NEGATIVE
PH UR STRIP: 7 [PH] (ref 5–8)
PLATELET # BLD AUTO: 314 K/UL
PMV BLD AUTO: 10.5 FL
POTASSIUM SERPL-SCNC: 3.8 MMOL/L
PROT SERPL-MCNC: 8.4 G/DL
PROT UR QL STRIP: ABNORMAL
PROTHROMBIN TIME: 12.8 SEC
RBC # BLD AUTO: 3.95 M/UL
RBC #/AREA URNS AUTO: 2 /HPF (ref 0–4)
SODIUM SERPL-SCNC: 140 MMOL/L
SP GR UR STRIP: 1.02 (ref 1–1.03)
TROPONIN I SERPL DL<=0.01 NG/ML-MCNC: <0.012 NG/ML
URN SPEC COLLECT METH UR: ABNORMAL
UROBILINOGEN UR STRIP-ACNC: NEGATIVE EU/DL
WBC # BLD AUTO: 16.27 K/UL
WBC #/AREA URNS AUTO: 3 /HPF (ref 0–5)
YEAST UR QL AUTO: ABNORMAL

## 2018-02-28 PROCEDURE — 25500020 PHARM REV CODE 255: Performed by: SURGERY

## 2018-02-28 PROCEDURE — 80053 COMPREHEN METABOLIC PANEL: CPT

## 2018-02-28 PROCEDURE — 85025 COMPLETE CBC W/AUTO DIFF WBC: CPT

## 2018-02-28 PROCEDURE — 25000003 PHARM REV CODE 250: Performed by: SURGERY

## 2018-02-28 PROCEDURE — 63600175 PHARM REV CODE 636 W HCPCS: Performed by: SURGERY

## 2018-02-28 PROCEDURE — 83690 ASSAY OF LIPASE: CPT

## 2018-02-28 PROCEDURE — 84484 ASSAY OF TROPONIN QUANT: CPT

## 2018-02-28 PROCEDURE — 81000 URINALYSIS NONAUTO W/SCOPE: CPT

## 2018-02-28 PROCEDURE — 96366 THER/PROPH/DIAG IV INF ADDON: CPT

## 2018-02-28 PROCEDURE — 85610 PROTHROMBIN TIME: CPT

## 2018-02-28 PROCEDURE — 99284 EMERGENCY DEPT VISIT MOD MDM: CPT | Mod: 25

## 2018-02-28 PROCEDURE — 96365 THER/PROPH/DIAG IV INF INIT: CPT

## 2018-02-28 PROCEDURE — 96361 HYDRATE IV INFUSION ADD-ON: CPT

## 2018-02-28 RX ORDER — ONDANSETRON 2 MG/ML
8 INJECTION INTRAMUSCULAR; INTRAVENOUS
Status: COMPLETED | OUTPATIENT
Start: 2018-02-28 | End: 2018-02-28

## 2018-02-28 RX ORDER — CIPROFLOXACIN 500 MG/1
500 TABLET ORAL
COMMUNITY
End: 2018-03-06

## 2018-02-28 RX ORDER — HYDRALAZINE HYDROCHLORIDE 20 MG/ML
20 INJECTION INTRAMUSCULAR; INTRAVENOUS
Status: COMPLETED | OUTPATIENT
Start: 2018-02-28 | End: 2018-02-28

## 2018-02-28 RX ORDER — HYDROCODONE BITARTRATE AND ACETAMINOPHEN 5; 325 MG/1; MG/1
1 TABLET ORAL
Status: COMPLETED | OUTPATIENT
Start: 2018-02-28 | End: 2018-02-28

## 2018-02-28 RX ORDER — ONDANSETRON 4 MG/1
4 TABLET, FILM COATED ORAL EVERY 6 HOURS PRN
Qty: 12 TABLET | Refills: 0 | Status: SHIPPED | OUTPATIENT
Start: 2018-02-28 | End: 2018-07-24

## 2018-02-28 RX ORDER — CLONIDINE HYDROCHLORIDE 0.1 MG/1
0.1 TABLET ORAL
Status: COMPLETED | OUTPATIENT
Start: 2018-02-28 | End: 2018-02-28

## 2018-02-28 RX ORDER — METOCLOPRAMIDE HYDROCHLORIDE 5 MG/ML
10 INJECTION INTRAMUSCULAR; INTRAVENOUS
Status: COMPLETED | OUTPATIENT
Start: 2018-02-28 | End: 2018-02-28

## 2018-02-28 RX ADMIN — METOCLOPRAMIDE 10 MG: 5 INJECTION, SOLUTION INTRAMUSCULAR; INTRAVENOUS at 06:02

## 2018-02-28 RX ADMIN — CLONIDINE HYDROCHLORIDE 0.1 MG: 0.1 TABLET ORAL at 09:02

## 2018-02-28 RX ADMIN — HYDROCODONE BITARTRATE AND ACETAMINOPHEN 1 TABLET: 5; 325 TABLET ORAL at 08:02

## 2018-02-28 RX ADMIN — HYDRALAZINE HYDROCHLORIDE 20 MG: 20 INJECTION INTRAMUSCULAR; INTRAVENOUS at 10:02

## 2018-02-28 RX ADMIN — IOHEXOL 90 ML: 350 INJECTION, SOLUTION INTRAVENOUS at 07:02

## 2018-02-28 RX ADMIN — ONDANSETRON 8 MG: 2 INJECTION, SOLUTION INTRAMUSCULAR; INTRAVENOUS at 06:02

## 2018-02-28 RX ADMIN — NITROGLYCERIN 1 INCH: 20 OINTMENT TOPICAL at 06:02

## 2018-02-28 RX ADMIN — SODIUM CHLORIDE 1000 ML: 0.9 INJECTION, SOLUTION INTRAVENOUS at 06:02

## 2018-02-28 RX ADMIN — PROMETHAZINE HYDROCHLORIDE 12.5 MG: 25 INJECTION INTRAMUSCULAR; INTRAVENOUS at 08:02

## 2018-03-01 NOTE — ED PROVIDER NOTES
Encounter Date: 2018       History     Chief Complaint   Patient presents with    Emesis     Pt  was released from hospital 4 days ago and has had continued nausea and vomiting.  had heart stents placed last week.       Patient complains of nausea and vomiting and crampy abdominal pain since last night.  She had a endovascular abdominal aneurysm repair last week at [facility] and was released from the hospital on .  She developed nausea Monday and Tuesday but did not vomit until last night.      The history is provided by the patient. No  was used.   Emesis    This is a new problem. The current episode started yesterday. The problem occurs 2 - 4 times per day. The problem has been gradually worsening. The emesis has an appearance of stomach contents. Associated symptoms include abdominal pain and headaches. Pertinent negatives include no fever.     Review of patient's allergies indicates:   Allergen Reactions    Crestor [rosuvastatin] Other (See Comments)     Causes tachycardia per patient    Iodine and iodide containing products     Seroquel [quetiapine]     Tramadol     Sulfa (sulfonamide antibiotics) Palpitations     Past Medical History:   Diagnosis Date    AAA (abdominal aortic aneurysm)     Anxiety     Bipolar affective     COPD (chronic obstructive pulmonary disease)     Hyperlipidemia     Hypertension     Neck pain      Past Surgical History:   Procedure Laterality Date    APPENDECTOMY      CARPAL TUNNEL RELEASE       SECTION      HYSTERECTOMY      OVARIAN CYST REMOVAL      TONSILLECTOMY       Family History   Problem Relation Age of Onset    Heart disease Mother     Hypertension Mother     Lung cancer Father     No Known Problems Sister     No Known Problems Brother     Heart disease Maternal Aunt     Heart disease Maternal Uncle     Heart disease Paternal Aunt     Heart disease Paternal Uncle     Anuerysm Maternal Grandmother      Lung cancer Maternal Grandfather     Heart disease Paternal Grandmother     Arrhythmia Son     Cancer Son     Hypertension Son     Hypertension Son     Coarctation of the aorta Son      Social History   Substance Use Topics    Smoking status: Current Every Day Smoker     Packs/day: 1.50     Types: Cigarettes    Smokeless tobacco: Never Used    Alcohol use No     Review of Systems   Constitutional: Positive for appetite change. Negative for fever.   HENT: Negative.    Eyes: Negative.    Respiratory: Negative.    Cardiovascular: Negative.    Gastrointestinal: Positive for abdominal pain and vomiting.   Genitourinary: Negative.    Musculoskeletal: Negative.    Skin: Negative.    Neurological: Positive for headaches.   Hematological: Negative.    Psychiatric/Behavioral: Negative.        Physical Exam     Initial Vitals [02/28/18 1754]   BP Pulse Resp Temp SpO2   (!) 144/79 (!) 129 20 98.3 °F (36.8 °C) 100 %      MAP       100.67         Physical Exam    Nursing note and vitals reviewed.  Constitutional: She appears well-developed.   HENT:   Head: Normocephalic.   Eyes: Conjunctivae are normal.   Neck: Normal range of motion.   Cardiovascular: Normal rate, normal heart sounds and intact distal pulses.   Bilateral groin hematomas from the percutaneous procedure done last week   Pulmonary/Chest: Breath sounds normal.   Abdominal:   Mild abdominal distention with some epigastric tenderness.  No localized tenderness or guarding.   Musculoskeletal: Normal range of motion.   Neurological: She is alert and oriented to person, place, and time.   Skin: Skin is warm.   Psychiatric: She has a normal mood and affect.         ED Course   Procedures  Labs Reviewed   CBC W/ AUTO DIFFERENTIAL - Abnormal; Notable for the following:        Result Value    WBC 16.27 (*)     RBC 3.95 (*)     Hematocrit 36.1 (*)     RDW 14.7 (*)     Gran # (ANC) 13.7 (*)     Mono # 1.1 (*)     Gran% 83.9 (*)     Lymph% 8.2 (*)     All other  components within normal limits   COMPREHENSIVE METABOLIC PANEL - Abnormal; Notable for the following:     Glucose 131 (*)     Anion Gap 18 (*)     All other components within normal limits   LIPASE - Abnormal; Notable for the following:     Lipase 14 (*)     All other components within normal limits   URINALYSIS - Abnormal; Notable for the following:     Protein, UA 1+ (*)     Occult Blood UA Trace (*)     Leukocytes, UA 1+ (*)     All other components within normal limits   PROTIME-INR - Abnormal; Notable for the following:     Prothrombin Time 12.8 (*)     All other components within normal limits   URINALYSIS MICROSCOPIC - Abnormal; Notable for the following:     Bacteria, UA Few (*)     Yeast, UA Moderate (*)     All other components within normal limits   TROPONIN I             Medical Decision Making:   Initial Assessment:   Vomiting.  Status post AAA repair.  Hypertension  ED Management:  The patient was given IV fluids and anti-Emetics and responded appropriately  her white count was elevated to 16,000 which represents an infectious process CAT scan of the abdomen was ordered and did not show any acute findings the stents are patent and are in proper placement.  There is no evidence of any intestinal ischemia.  During the course of her hospital ED treatment she was hypertensive which had to be controlled with nitro paste and hydralazine IV  she will be discharged on Zofran and to follow-up to primary doctor and return if her symptoms continue.                      Clinical Impression:   The primary encounter diagnosis was Non-intractable cyclical vomiting without nausea. Diagnoses of Gastroenteritis, Essential hypertension, and Status post abdominal aortic aneurysm repair were also pertinent to this visit.                           SHERINE Flores III, MD  02/28/18 9521

## 2018-03-01 NOTE — ED NOTES
Patient identifiers verified and correct for Min Cerda.    LOC: The patient is awake, alert and aware of environment with an appropriate affect, the patient is oriented x 3 and speaking appropriately.  APPEARANCE: Patient resting comfortably and in no acute distress, patient is clean and well groomed, patient's clothing is properly fastened. Patient is thin.   SKIN: The skin is warm and dry, color consistent with ethnicity, patient has normal skin turgor and moist mucus membranes, skin intact, no breakdown noted. Bruising noted to the right  thigh    MUSCULOSKELETAL: Patient moving all extremities spontaneously, no obvious swelling or deformities noted.  RESPIRATORY: Airway is open and patent, respirations are spontaneous, patient has a normal effort and rate, no accessory muscle use noted, clear bilateral breath sounds noted through out the chest.  CARDIAC: Patient is tachycardiac (), no periphreal edema noted, capillary refill < 3 seconds.  ABDOMEN: Soft and  tender to palpation, no distention noted, normoactive bowel sounds present in all four quadrants.

## 2018-03-05 NOTE — DISCHARGE SUMMARY
[facility]  Discharge Summary      Admit Date: 2/23/2018    Discharge Date and Time:  03/05/2018 4:50 PM    Attending Physician: Nii Foster MD     Reason for Admission: Aorto-iliac repair    Procedures Performed: Procedure(s) (LRB):  Repair-Aneurysm-Abdominal Aortic-Endovascular (Aaa) (N/A)    Hospital Course (synopsis of major diagnoses, care, treatment, and services provided during the course of the hospital stay): The pt was brought to the cath lab and aorto iliac Endologix graft placed successfully without post procedure complications. Post hospitalization course uncomplicated and discharged home in improved condition.        Final Diagnoses:    Principal Problem: PAD (peripheral artery disease)   Secondary Diagnoses:   Active Hospital Problems    Diagnosis  POA    *PAD (peripheral artery disease) [I73.9]  Yes     Priority: Low    Claudication [I73.9]  Yes    Weakness of both lower extremities [R29.898]  Yes    EASLEY (dyspnea on exertion) [R06.09]  Yes    Chronic obstructive pulmonary disease [J44.9]  Yes    Anxiety [F41.9]  Yes      Resolved Hospital Problems    Diagnosis Date Resolved POA   No resolved problems to display.       Discharged Condition: good    Disposition: Home or Self Care    Follow Up/Patient Instructions:     Medications:  Reconciled Home Medications:   Discharge Medication List as of 2/25/2018 12:30 PM      START taking these medications    Details   clopidogrel (PLAVIX) 75 mg tablet Take 1 tablet (75 mg total) by mouth once daily., Starting Mon 2/26/2018, Until Tue 2/26/2019, Normal         CONTINUE these medications which have NOT CHANGED    Details   albuterol 90 mcg/actuation inhaler Inhale 2 puffs into the lungs every 6 (six) hours as needed for Wheezing. Rescue, Starting Fri 2/9/2018, Until Sat 2/9/2019, Normal      amlodipine-benazepril 10-20mg (LOTREL) 10-20 mg per capsule Starting Tue 12/12/2017, Historical Med      aspirin 81 MG Chew Take 1 tablet (81  mg total) by mouth once daily., Starting Tue 7/11/2017, Until Wed 7/11/2018, OTC      butalbital-acetaminophen-caffeine -40 mg (FIORICET, ESGIC) -40 mg per tablet Starting Sat 9/9/2017, Historical Med      cilostazol (PLETAL) 100 MG Tab Take 1 tablet (100 mg total) by mouth 2 (two) times daily., Starting Fri 10/6/2017, Until Sat 10/6/2018, Normal      cimetidine (TAGAMET HB) 200 MG tablet Take 1 tablet 13 hours, 7, hours, and 1 hour before the procedure, Normal      diazePAM (VALIUM) 5 MG tablet Take 1 tablet (5 mg total) by mouth every 12 (twelve) hours as needed (Anxiety)., Starting Sat 8/26/2017, No Print      doxepin (SINEQUAN) 150 MG Cap Starting Sun 12/24/2017, Historical Med      fluticasone-vilanterol (BREO ELLIPTA) 200-25 mcg/dose DsDv diskus inhaler Inhale 1 puff into the lungs once daily. Controller, Starting Fri 2/9/2018, Normal      folic acid (FOLVITE) 1 MG tablet Take 1 mg by mouth once daily., Until Discontinued, Historical Med      gabapentin (NEURONTIN) 600 MG tablet Starting Sat 9/9/2017, Historical Med      hydrocodone-acetaminophen 10-325mg (NORCO)  mg Tab Take by mouth every 4 to 6 hours as needed., Historical Med      mirtazapine (REMERON) 15 MG tablet Starting Fri 9/29/2017, Historical Med      nitroGLYCERIN (NITROSTAT) 0.4 MG SL tablet Place 0.4 mg under the tongue every 5 (five) minutes as needed for Chest pain., Historical Med      pravastatin (PRAVACHOL) 20 MG tablet Take 1 tablet (20 mg total) by mouth once daily., Starting Tue 7/11/2017, Until Wed 7/11/2018, Normal      predniSONE (DELTASONE) 50 MG Tab Take one table 13 hours before procedure, 7 hours before procedure and 1 hour before procedure, Normal      promethazine (PHENERGAN) 25 MG tablet Starting Fri 9/29/2017, Historical Med      tiZANidine (ZANAFLEX) 4 MG tablet Starting Fri 2/2/2018, Historical Med      meloxicam (MOBIC) 15 MG tablet Starting Sat 7/29/2017, Historical Med      topiramate (TOPAMAX) 100 MG  tablet Starting Fri 12/1/2017, Historical Med         STOP taking these medications       ciprofloxacin HCl (CIPRO) 500 MG tablet Comments:   Reason for Stopping:               Discharge Procedure Orders  Diet Cardiac     Activity as tolerated       Follow-up Information     Nii Foster MD In 2 weeks.    Specialties:  INTERVENTIONAL CARDIOLOGY, Cardiology  Why:  offices closed on weekend, patient to schedule own follow up appointment  Contact information:  [address]  [address]  [facility] [address] [phone]

## 2018-03-06 ENCOUNTER — OFFICE VISIT (OUTPATIENT)
Dept: CARDIOLOGY | Facility: CLINIC | Age: 58
End: 2018-03-06
Payer: COMMERCIAL

## 2018-03-06 VITALS — WEIGHT: 102.69 LBS | BODY MASS INDEX: 19.4 KG/M2 | SYSTOLIC BLOOD PRESSURE: 102 MMHG | DIASTOLIC BLOOD PRESSURE: 70 MMHG

## 2018-03-06 DIAGNOSIS — I74.09 CHRONIC AORTO-ILIAC OCCLUSION SYNDROME: Primary | ICD-10-CM

## 2018-03-06 DIAGNOSIS — I73.9 CLAUDICATION: ICD-10-CM

## 2018-03-06 DIAGNOSIS — R29.898 WEAKNESS OF BOTH LOWER EXTREMITIES: ICD-10-CM

## 2018-03-06 DIAGNOSIS — R06.09 DOE (DYSPNEA ON EXERTION): ICD-10-CM

## 2018-03-06 DIAGNOSIS — J44.9 CHRONIC OBSTRUCTIVE PULMONARY DISEASE, UNSPECIFIED COPD TYPE: ICD-10-CM

## 2018-03-06 DIAGNOSIS — I73.9 PAD (PERIPHERAL ARTERY DISEASE): ICD-10-CM

## 2018-03-06 DIAGNOSIS — E87.1 HYPONATREMIA: ICD-10-CM

## 2018-03-06 PROCEDURE — 3074F SYST BP LT 130 MM HG: CPT | Mod: S$GLB,,, | Performed by: INTERNAL MEDICINE

## 2018-03-06 PROCEDURE — 99999 PR PBB SHADOW E&M-EST. PATIENT-LVL IV: CPT | Mod: PBBFAC,,, | Performed by: INTERNAL MEDICINE

## 2018-03-06 PROCEDURE — 3078F DIAST BP <80 MM HG: CPT | Mod: S$GLB,,, | Performed by: INTERNAL MEDICINE

## 2018-03-06 PROCEDURE — 99214 OFFICE O/P EST MOD 30 MIN: CPT | Mod: S$GLB,,, | Performed by: INTERNAL MEDICINE

## 2018-03-06 NOTE — PROGRESS NOTES
Subjective:    Patient ID:  Min Cerda is a 57 y.o. female who presents for follow-up of Peripheral Arterial Disease      HPI  58 y/o female with hx of PAD suggested by arterial doppler and confirmed with CTA, COPD, active tobacco use, fam hx of early CAD who presents for f/u.   Recent aorto-iliac occlusive disease repair with successful Endologix endovascular stent graft placement ('s crossed in antegrade and retrograde fashion with serial dilation, 22 x 60 AFX2 bifurcated device with 13 x 40 iliac limb extensions placed and post dilated with 10 x 40 balloon in main body and 8 x 60 and 8 x 40 balloons in iliac limbs, 9 x 40 self expanding stent and 9 x 30 self expanding stent in each EIA's). Post procedure uncomplicated and discharged home in improved condition.   Doing well and leg claudication has resolved. Leg weakness has improved and color is better. Continues to complain of intermittent bilateral foot numbness, unchanged and back pain. Symptomatically much improved regarding PAD. Has had a nuclear SPECT which was normal. Denies CP, orthopnea, PND, syncope.       Review of Systems   Constitution: Positive for weakness. Negative for malaise/fatigue.   HENT: Negative for congestion.    Eyes: Negative for blurred vision.   Cardiovascular: Negative for chest pain, claudication, cyanosis, dyspnea on exertion, irregular heartbeat, leg swelling, near-syncope, orthopnea, palpitations, paroxysmal nocturnal dyspnea and syncope.   Respiratory: Negative for shortness of breath.    Endocrine: Negative for polyuria.   Hematologic/Lymphatic: Negative for bleeding problem.   Skin: Negative for itching and rash.   Musculoskeletal: Positive for back pain and joint pain. Negative for joint swelling, muscle cramps and muscle weakness.   Gastrointestinal: Negative for abdominal pain, hematemesis, hematochezia, melena, nausea and vomiting.   Genitourinary: Negative for dysuria and hematuria.   Neurological: Positive for  numbness. Negative for dizziness, focal weakness, headaches, light-headedness and loss of balance.   Psychiatric/Behavioral: Negative for depression. The patient is not nervous/anxious.         Objective:    Physical Exam   Constitutional: She is oriented to person, place, and time. She appears well-developed and well-nourished.   HENT:   Head: Normocephalic and atraumatic.   Neck: Neck supple. No JVD present.   Cardiovascular: Normal rate, regular rhythm and normal heart sounds.    Pulses:       Carotid pulses are 2+ on the right side, and 2+ on the left side.       Radial pulses are 2+ on the right side, and 2+ on the left side.        Femoral pulses are 2+ on the right side, and 2+ on the left side.       Posterior tibial pulses are 2+ on the right side, and 2+ on the left side.   Pulmonary/Chest: Effort normal and breath sounds normal.   Abdominal: Soft. Bowel sounds are normal.   Musculoskeletal: She exhibits no edema.   Neurological: She is alert and oriented to person, place, and time.   Skin: Skin is warm and dry.   Psychiatric: She has a normal mood and affect. Her behavior is normal. Thought content normal.         Assessment:       1. PAD (peripheral artery disease)    2. Chronic aorto-iliac occlusion syndrome    3. Claudication    4. Chronic obstructive pulmonary disease, unspecified COPD type    5. Weakness of both lower extremities    6. EASLEY (dyspnea on exertion)    7. Hyponatremia      56 y/o female with hx and presentation as above. Doing well post procedure and no access site issues. Was seen in ED recently for viral syndrome and improved with IVF and BP management. Will obtain surveillance CTA.        Plan:       -Continue current medical management  -Surveillance CTA  -f/u in 3 months

## 2018-03-13 LAB
POC ACTIVATED CLOTTING TIME K: 208 SEC (ref 74–137)
POC ACTIVATED CLOTTING TIME K: 235 SEC (ref 74–137)
POC ACTIVATED CLOTTING TIME K: 263 SEC (ref 74–137)
POC ACTIVATED CLOTTING TIME K: 296 SEC (ref 74–137)
SAMPLE: ABNORMAL

## 2018-03-22 NOTE — TELEPHONE ENCOUNTER
----- Message from Nancy Don sent at 3/22/2018  9:02 AM CDT -----  Contact: Self/ [phone]  Patient called in to get prescription refill. Please call and advise.     clopidogrel (PLAVIX) 75 mg tablet    [id number] - [place] - [address] [place] [address]

## 2018-03-23 RX ORDER — CLOPIDOGREL BISULFATE 75 MG/1
75 TABLET ORAL DAILY
Qty: 30 TABLET | Refills: 6 | Status: SHIPPED | OUTPATIENT
Start: 2018-03-23 | End: 2019-03-23

## 2018-03-23 NOTE — TELEPHONE ENCOUNTER
----- Message from Batsheva Simmons sent at 3/23/2018 10:53 AM CDT -----  Contact: self, [phone]  Patient states she is calling back requesting her clopidogrel prescription refill. States she only has one left.

## 2018-03-23 NOTE — TELEPHONE ENCOUNTER
Patient of Dr Foster, last seen on 3/6.    Could you please approve Plavix since she will be out today.    Thanks

## 2018-05-21 ENCOUNTER — TELEPHONE (OUTPATIENT)
Dept: CARDIOLOGY | Facility: CLINIC | Age: 58
End: 2018-05-21

## 2018-05-21 NOTE — TELEPHONE ENCOUNTER
----- Message from Aurelia Masterson sent at 5/21/2018  3:48 PM CDT -----  Contact: [facility] [phone]  Pharmacy would like to get prescription order change for amlodipine-benazepril 10-20mg (LOTREL) 10-20 mg per capsule. Patient is a stroke patient and her  just had a stroke so she'll need a prescription for 5 MG because she is unable to cut the pills in half. Please call and advise.

## 2018-06-01 RX ORDER — AMLODIPINE BESYLATE 10 MG/1
5 TABLET ORAL DAILY
COMMUNITY
Start: 2018-03-09

## 2018-06-11 DIAGNOSIS — M79.671 PAIN IN BOTH FEET: Primary | ICD-10-CM

## 2018-06-11 DIAGNOSIS — M79.672 PAIN IN BOTH FEET: Primary | ICD-10-CM

## 2018-06-12 ENCOUNTER — HOSPITAL ENCOUNTER (EMERGENCY)
Facility: HOSPITAL | Age: 58
Discharge: HOME | End: 2018-06-12
Attending: EMERGENCY MEDICINE
Payer: COMMERCIAL

## 2018-06-12 VITALS
HEIGHT: 61 IN | BODY MASS INDEX: 19.26 KG/M2 | HEART RATE: 88 BPM | OXYGEN SATURATION: 98 % | SYSTOLIC BLOOD PRESSURE: 138 MMHG | RESPIRATION RATE: 20 BRPM | TEMPERATURE: 98 F | WEIGHT: 102 LBS | DIASTOLIC BLOOD PRESSURE: 68 MMHG
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
## 2018-06-12 DIAGNOSIS — M25.551 ACUTE RIGHT HIP PAIN: ICD-10-CM

## 2018-06-12 DIAGNOSIS — R74.8 ELEVATED LIVER ENZYMES: ICD-10-CM

## 2018-06-12 DIAGNOSIS — S01.81XA LACERATION OF FOREHEAD, INITIAL ENCOUNTER: Primary | ICD-10-CM

## 2018-06-12 LAB
ALBUMIN SERPL BCP-MCNC: 3.4 G/DL
ALP SERPL-CCNC: 126 U/L
ALT SERPL W/O P-5'-P-CCNC: 107 U/L
ANION GAP SERPL CALC-SCNC: 11 MMOL/L
AST SERPL-CCNC: 204 U/L
BASOPHILS # BLD AUTO: 0.02 K/UL
BASOPHILS NFR BLD: 0.2 %
BILIRUB SERPL-MCNC: 0.2 MG/DL
BUN SERPL-MCNC: 11 MG/DL
CALCIUM SERPL-MCNC: 8.9 MG/DL
CHLORIDE SERPL-SCNC: 100 MMOL/L
CO2 SERPL-SCNC: 23 MMOL/L
CREAT SERPL-MCNC: 0.8 MG/DL
DIFFERENTIAL METHOD: ABNORMAL
EOSINOPHIL # BLD AUTO: 0.3 K/UL
EOSINOPHIL NFR BLD: 4 %
ERYTHROCYTE [DISTWIDTH] IN BLOOD BY AUTOMATED COUNT: 14.5 %
EST. GFR  (AFRICAN AMERICAN): >60 ML/MIN/1.73 M^2
EST. GFR  (NON AFRICAN AMERICAN): >60 ML/MIN/1.73 M^2
GLUCOSE SERPL-MCNC: 98 MG/DL
HCT VFR BLD AUTO: 34.4 %
HGB BLD-MCNC: 11.1 G/DL
LYMPHOCYTES # BLD AUTO: 1.5 K/UL
LYMPHOCYTES NFR BLD: 17.7 %
MAGNESIUM SERPL-MCNC: 2.1 MG/DL
MCH RBC QN AUTO: 28.1 PG
MCHC RBC AUTO-ENTMCNC: 32.3 G/DL
MCV RBC AUTO: 87 FL
MONOCYTES # BLD AUTO: 0.4 K/UL
MONOCYTES NFR BLD: 5 %
NEUTROPHILS # BLD AUTO: 6 K/UL
NEUTROPHILS NFR BLD: 72.9 %
PLATELET # BLD AUTO: 186 K/UL
PMV BLD AUTO: 9.4 FL
POTASSIUM SERPL-SCNC: 3.7 MMOL/L
PROT SERPL-MCNC: 6.4 G/DL
RBC # BLD AUTO: 3.95 M/UL
SODIUM SERPL-SCNC: 134 MMOL/L
TROPONIN I SERPL DL<=0.01 NG/ML-MCNC: 0.01 NG/ML
WBC # BLD AUTO: 8.21 K/UL

## 2018-06-12 PROCEDURE — 83735 ASSAY OF MAGNESIUM: CPT

## 2018-06-12 PROCEDURE — 93005 ELECTROCARDIOGRAM TRACING: CPT

## 2018-06-12 PROCEDURE — 90715 TDAP VACCINE 7 YRS/> IM: CPT | Performed by: EMERGENCY MEDICINE

## 2018-06-12 PROCEDURE — 25000003 PHARM REV CODE 250: Performed by: EMERGENCY MEDICINE

## 2018-06-12 PROCEDURE — 80053 COMPREHEN METABOLIC PANEL: CPT

## 2018-06-12 PROCEDURE — 63600175 PHARM REV CODE 636 W HCPCS: Performed by: EMERGENCY MEDICINE

## 2018-06-12 PROCEDURE — 90471 IMMUNIZATION ADMIN: CPT | Performed by: EMERGENCY MEDICINE

## 2018-06-12 PROCEDURE — 85025 COMPLETE CBC W/AUTO DIFF WBC: CPT

## 2018-06-12 PROCEDURE — 99285 EMERGENCY DEPT VISIT HI MDM: CPT | Mod: 25

## 2018-06-12 PROCEDURE — 12013 RPR F/E/E/N/L/M 2.6-5.0 CM: CPT

## 2018-06-12 PROCEDURE — 84484 ASSAY OF TROPONIN QUANT: CPT

## 2018-06-12 RX ORDER — LIDOCAINE HYDROCHLORIDE 10 MG/ML
1 INJECTION INFILTRATION; PERINEURAL
Status: COMPLETED | OUTPATIENT
Start: 2018-06-12 | End: 2018-06-12

## 2018-06-12 RX ORDER — BACITRACIN ZINC 500 UNIT/G
OINTMENT (GRAM) TOPICAL
Status: COMPLETED | OUTPATIENT
Start: 2018-06-12 | End: 2018-06-12

## 2018-06-12 RX ADMIN — Medication 5 ML: at 08:06

## 2018-06-12 RX ADMIN — CLOSTRIDIUM TETANI TOXOID ANTIGEN (FORMALDEHYDE INACTIVATED), CORYNEBACTERIUM DIPHTHERIAE TOXOID ANTIGEN (FORMALDEHYDE INACTIVATED), BORDETELLA PERTUSSIS TOXOID ANTIGEN (GLUTARALDEHYDE INACTIVATED), BORDETELLA PERTUSSIS FILAMENTOUS HEMAGGLUTININ ANTIGEN (FORMALDEHYDE INACTIVATED), BORDETELLA PERTUSSIS PERTACTIN ANTIGEN, AND BORDETELLA PERTUSSIS FIMBRIAE 2/3 ANTIGEN 0.5 ML: 5; 2; 2.5; 5; 3; 5 INJECTION, SUSPENSION INTRAMUSCULAR at 09:06

## 2018-06-12 RX ADMIN — BACITRACIN: 500 OINTMENT TOPICAL at 09:06

## 2018-06-12 RX ADMIN — LIDOCAINE HYDROCHLORIDE 1 ML: 10 INJECTION, SOLUTION INFILTRATION; PERINEURAL at 08:06

## 2018-06-12 NOTE — ED NOTES
APPEARANCE: Alert, oriented and in no acute distress.  CARDIAC: Normal rate and rhythm, no murmur heard.   PERIPHERAL VASCULAR: peripheral pulses present. Normal cap refill. No edema. Warm to touch.    RESPIRATORY:Normal rate and effort, breath sounds clear bilaterally throughout chest. Respirations are equal and unlabored no obvious signs of distress.  GASTRO: soft, bowel sounds normal, no tenderness, no abdominal distention.  MUSC: Full ROM. No bony tenderness or soft tissue tenderness. No obvious deformity.  EYE: PERRL, both eyes: pupils brisk and reactive to light. Normal size.  ENT: EARS: no obvious drainage. NOSE: no active bleeding.

## 2018-06-12 NOTE — ED NOTES
57 year old female presents to ed cc of fall. Upon ed arrival patient awake alert oriented to self and situation only. Ems reports patient has had recent change in sleeping medication

## 2018-06-12 NOTE — ED PROVIDER NOTES
Encounter Date: 6/12/2018    SCRIBE #1 NOTE: I, Gurvinder Hackett, am scribing for, and in the presence of,  Dr. Robina Guillaume. I have scribed the entire note.       History     Chief Complaint   Patient presents with    Fall     57 year old female presents to ed via [facility]. ems reports patient fell at home from standing position patient has had recent change in sleeping med (remeron from 15mg to 30mg on 6/6) patient takes plavix.      Min Cerda is a 57 y.o. female who  has a past medical history of AAA (abdominal aortic aneurysm); Anxiety; Bipolar affective; COPD (chronic obstructive pulmonary disease); Hyperlipidemia; Hypertension; and Neck pain.    The patient presents to the ED due to fall earlier this morning between 3-5 am while at home with resultant bruising and a lac over the forehead. Patient states that she was trying to go to bathroom, stood up from bed and fell and hit the bed post, hitting her head. States that she has frequent falls due to her peripheral neuropathy.  Denies LOC.   She c/o pain in the head, upper and lower back. Patient oriented to person and place but not time.  + ecchymoses to right hip and 3 cm laceration to left forehead.   Per EMS pt was ambulatory on scene.   Reportedly remeron increased from 15 mg to 30 mg.  She is taking plavix. Pt lives with family who are not present on initial exam, unsure of baseline mental status.        The history is provided by the patient and the EMS personnel.     Review of patient's allergies indicates:   Allergen Reactions    Crestor [rosuvastatin] Other (See Comments)     Causes tachycardia per patient    Iodine and iodide containing products     Seroquel [quetiapine]     Tramadol     Sulfa (sulfonamide antibiotics) Palpitations     Past Medical History:   Diagnosis Date    AAA (abdominal aortic aneurysm)     Anxiety     Bipolar affective     COPD (chronic obstructive pulmonary disease)     Hyperlipidemia     Hypertension      Neck pain      Past Surgical History:   Procedure Laterality Date    APPENDECTOMY      CARPAL TUNNEL RELEASE       SECTION      HYSTERECTOMY      OVARIAN CYST REMOVAL      TONSILLECTOMY       Family History   Problem Relation Age of Onset    Heart disease Mother     Hypertension Mother     Lung cancer Father     No Known Problems Sister     No Known Problems Brother     Heart disease Maternal Aunt     Heart disease Maternal Uncle     Heart disease Paternal Aunt     Heart disease Paternal Uncle     Anuerysm Maternal Grandmother     Lung cancer Maternal Grandfather     Heart disease Paternal Grandmother     Arrhythmia Son     Cancer Son     Hypertension Son     Hypertension Son     Coarctation of the aorta Son      Social History   Substance Use Topics    Smoking status: Current Every Day Smoker     Packs/day: 1.50     Types: Cigarettes    Smokeless tobacco: Never Used    Alcohol use No     Review of Systems   Constitutional: Negative for activity change, appetite change, chills, diaphoresis, fatigue and fever.   HENT: Negative for congestion, rhinorrhea and sore throat.    Eyes: Negative for pain and visual disturbance.   Respiratory: Negative for cough, chest tightness and shortness of breath.    Cardiovascular: Negative for chest pain and palpitations.   Gastrointestinal: Negative for abdominal distention, abdominal pain, diarrhea, nausea and vomiting.   Endocrine: Negative for polydipsia and polyphagia.   Genitourinary: Negative for difficulty urinating, dysuria and flank pain.   Musculoskeletal: Positive for arthralgias and neck pain.   Skin: Positive for wound. Negative for pallor and rash.   Neurological: Negative for dizziness, syncope, light-headedness and headaches.   Psychiatric/Behavioral: Positive for confusion.       Physical Exam     Initial Vitals [18 0713]   BP Pulse Resp Temp SpO2   (!) 143/76 97 15 97.7 °F (36.5 °C) (!) 93 %      MAP       --          Physical Exam    Nursing note and vitals reviewed.  Constitutional: She appears well-developed and well-nourished. She is not diaphoretic. No distress.   HENT:   Head: Normocephalic and atraumatic.   Right Ear: External ear normal.   Left Ear: External ear normal.   Nose: Nose normal.   Mouth/Throat: Oropharynx is clear and moist.   3 cm lac over center left forehead near hairline  bleeding controlled  no septal hematoma   Eyes: Conjunctivae and EOM are normal. Pupils are equal, round, and reactive to light. Right eye exhibits no discharge. Left eye exhibits no discharge. No scleral icterus.   Neck: Normal range of motion. Neck supple.   Cardiovascular: Normal rate, regular rhythm, normal heart sounds and intact distal pulses. Exam reveals no gallop and no friction rub.    No murmur heard.  Pulmonary/Chest: Breath sounds normal. No respiratory distress. She has no wheezes. She has no rhonchi. She has no rales.   Abdominal: Soft. Bowel sounds are normal. She exhibits no distension. There is no tenderness. There is no rebound and no guarding.   Musculoskeletal: Normal range of motion.   no C, T, L spine step offs  No bony tenderness  nml alignment   mild tenderness R lateral hip   Neurological: She is alert. She has normal strength. No cranial nerve deficit or sensory deficit.   neurovascularly intact  symmetric face  GCS 14  oriented to person and place but not time   Skin: Skin is warm and dry. Capillary refill takes less than 2 seconds.   6 cm diameter bruise to R lower back   Psychiatric: She has a normal mood and affect. Thought content normal.         ED Course   Lac Repair  Date/Time: 6/12/2018 8:53 AM  Performed by: NORMA NANCE.  Authorized by: NORMA NANCE   Body area: head/neck  Location details: forehead  Laceration length: 3 cm  Foreign bodies: no foreign bodies  Tendon involvement: none  Nerve involvement: none  Anesthesia: local infiltration    Anesthesia:  Local Anesthetic: lidocaine 1%  without epinephrine  Anesthetic total: 2 mL  Preparation: Patient was prepped and draped in the usual sterile fashion.  Irrigation solution: saline  Amount of cleaning: standard  Debridement: none  Degree of undermining: none  Skin closure: 5-0 nylon  Number of sutures: 6  Technique: simple  Approximation: close  Approximation difficulty: simple  Dressing: antibiotic ointment  Patient tolerance: Patient tolerated the procedure well with no immediate complications        Labs Reviewed   COMPREHENSIVE METABOLIC PANEL - Abnormal; Notable for the following:        Result Value    Sodium 134 (*)     Albumin 3.4 (*)      (*)      (*)     All other components within normal limits   CBC W/ AUTO DIFFERENTIAL - Abnormal; Notable for the following:     RBC 3.95 (*)     Hemoglobin 11.1 (*)     Hematocrit 34.4 (*)     Lymph% 17.7 (*)     All other components within normal limits   TROPONIN I   MAGNESIUM   URINALYSIS     EKG Readings: (Independently Interpreted)   Initial Reading: No STEMI. Rhythm: Normal Sinus Rhythm. Heart Rate: 97. Ectopy: No Ectopy. ST Segments: Normal ST Segments. T Waves Flipped: V1.       CT Head Without Contrast   Final Result      No evidence of acute intracranial hemorrhage.         Electronically signed by: Willow Harris MD   Date:    06/12/2018   Time:    08:54      CT Cervical Spine Without Contrast   Final Result      1. No acute fracture.   2. 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  There is mild anterolisthesis of C2-3 favored to be related to degenerative change.   3. Numerous small lucent lesions are noted in the mid cervical spine favored to be related to degenerative subchondral cysts however metastatic disease or myeloma could have a similar appearance.  If there is clinical concern for either of these entities, bone scan could be used for further evaluation.         Electronically signed by: Willow Harris MD    Date:    06/12/2018   Time:    08:54      X-Ray Chest 1 View   Final Result      Mild diffuse central weight interstitial markings may relate to interstitial edema or mild underlying interstitial lung disease, though may also be artifactually accentuated related to technique.         Electronically signed by: Roberto Wilson MD   Date:    06/12/2018   Time:    08:43      X-Ray Hip 2 View Right   Final Result      No fracture or dislocation.         Electronically signed by: Roberto Wilson MD   Date:    06/12/2018   Time:    08:41        Imaging Results          X-Ray Hip 2 View Right (Final result)  Result time 06/12/18 08:41:28    Final result by Roberto Wilson MD (06/12/18 08:41:28)                 Impression:      No fracture or dislocation.      Electronically signed by: Roberto Wilson MD  Date:    06/12/2018  Time:    08:41             Narrative:    EXAMINATION:  XR HIP 2 VIEW RIGHT    CLINICAL HISTORY:  Pain in right hip    TECHNIQUE:  AP view of the pelvis and frog leg lateral view of the right hip were performed.    COMPARISON:  None    FINDINGS:  No fracture or dislocation.  Aorto bi-iliac stent material is partially imaged.                               X-Ray Chest 1 View (Final result)  Result time 06/12/18 08:43:38    Final result by Roberto Wilson MD (06/12/18 08:43:38)                 Impression:      Mild diffuse central weight interstitial markings may relate to interstitial edema or mild underlying interstitial lung disease, though may also be artifactually accentuated related to technique.      Electronically signed by: Roberto Wilson MD  Date:    06/12/2018  Time:    08:43             Narrative:    EXAMINATION:  XR CHEST 1 VIEW    CLINICAL HISTORY:  Unspecified fall, initial encounter    TECHNIQUE:  Single frontal portable view of the chest was performed.    COMPARISON:  Chest radiograph 02/13/2018    FINDINGS:  Support devices: None    Interstitial markings throughout the lungs are mildly accentuated.  No focal  consolidative opacity.  No pleural effusion or pneumothorax.    The cardiac silhouette is normal in size. The hilar and mediastinal contours are unremarkable.    Surgical clips overlie the right upper quadrant.  Midline abdominal vascular stent is partially imaged.    Bones are intact.                               CT Cervical Spine Without Contrast (Final result)  Result time 06/12/18 08:54:05    Final result by Willow Harris MD (06/12/18 08:54:05)                 Impression:      1. No acute fracture.  2. 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  There is mild anterolisthesis of C2-3 favored to be related to degenerative change.  3. Numerous small lucent lesions are noted in the mid cervical spine favored to be related to degenerative subchondral cysts however metastatic disease or myeloma could have a similar appearance.  If there is clinical concern for either of these entities, bone scan could be used for further evaluation.      Electronically signed by: Willow Harris MD  Date:    06/12/2018  Time:    08:54             Narrative:    EXAMINATION:  CT CERVICAL SPINE WITHOUT CONTRAST    CLINICAL HISTORY:  Neck pain, first study;    TECHNIQUE:  Low dose axial images, sagittal and coronal reformations were performed though the cervical spine.  Contrast was not administered.    COMPARISON:  None    FINDINGS:  There is mild grade 1 anterolisthesis of C2 on 3 measuring approximately 2 mm.  There is incomplete fusion of the anterior arch of C1, a congenital variant.  This is well corticated and does not have the appearance of an acute fracture.  There is hypertrophic degenerative change at the atlantoaxial joint with probable accessory ossicle at this site.  The lateral masses are well aligned and the odontoid is intact.  The    Additional degenerative changes are detailed as follows:    C2-3: Mild left uncovertebral spurring    C3-4: Bulky right facet  arthropathy mild left facet arthropathy and bilateral uncovertebral spurring with probable bilateral neural foraminal narrowing    C4-5: Uncovertebral spurring posterior disc osteophyte complex and facet arthropathy with no definite canal or foraminal narrowing    C5-6: Posterior disc osteophyte complex and uncovertebral spurring as well as mild facet arthropathy without definite canal or neural foraminal narrowing    C6-7: Posterior disc osteophyte complex uncovertebral spurring with probable left neural foraminal narrowing.  Several small lucent lesions are noted in the cervical vertebra favored to reflect subchondral cysts related to degenerative change however, metastatic disease or myeloma could conceivably have a similar appearance.  If there is clinical concern for either of these entities, bone scan should be considered for further evaluation..                               CT Head Without Contrast (Final result)  Result time 06/12/18 08:54:21    Final result by Willow Harris MD (06/12/18 08:54:21)                 Impression:      No evidence of acute intracranial hemorrhage.      Electronically signed by: Willow Harris MD  Date:    06/12/2018  Time:    08:54             Narrative:    EXAMINATION:  CT HEAD WITHOUT CONTRAST    CLINICAL HISTORY:  Head trauma, minor, GCS>=13, NOC/NEXUS/CCR neg, first study;    TECHNIQUE:  Low dose axial images were obtained through the head.  Coronal and sagittal reformations were also performed. Contrast was not administered.    COMPARISON:  None.    FINDINGS:  The brain parenchyma is of normal attenuation with no evidence of intracranial hemorrhage, major vascular distribution infarct or mass effect.  There are no extra-axial masses or fluid collections.  The ventricular system is of normal size for age and midline.    Visualized paranasal sinuses and mastoid air cells are clear.  There is no evidence of skull fracture.  Soft tissue swelling and laceration is noted  overlying the left frontal convexity consistent with scalp injury.    There is some irregularity at the anterior arch of C1 which is favored to be related to congenital variant and degenerative change.  Please see CT of cervical spine for details.                                   Medical Decision Making:   History:   Old Medical Records: I decided to obtain old medical records.  Initial Assessment:   Patient is a 57 y.o. female presenting to ED s/p fall at home.   Exam with lac over forehead, bruising over R back/hip, some confusion.  Plan to obtain CXR, Xray R hip, CT C-spine, CT head, cardiac workup, lac repair.  Will continue to monitor closely and reassess.    Differential Diagnosis:   Contusion, laceration, abrasion, fracture, dislocation  Independently Interpreted Test(s):   I have ordered and independently interpreted EKG Reading(s) - see prior notes  Clinical Tests:   Lab Tests: Ordered and Reviewed  The following lab test(s) were unremarkable: CBC  Radiological Study: Ordered and Reviewed  Medical Tests: Ordered and Reviewed  ED Management:  All findings discussed with patient.  She agrees to follow up with PCP for elevated liver enzymes and bony lucency on CT cervical spine.  Given instructions for wound care for laceration.  Also counseled on importance of fall prevention and recommended she take lower dose of sleeping medication.  Patient has returned to neurological baseline per family and is stable for discharge.                     ED Course as of Jun 12 0917   Tue Jun 12, 2018   0840  and son now at bedside and state that patient fell out of bed 3 times last night and state that she is at her neurologic baseline.  States that it is normal for her to not be oriented to time.    [LD]   0850 Patient now more alert and denies feeling dizzy or syncope prior to fall.  States that she tripped and fell because of her neuropathy.    [LD]      ED Course User Index  [LD] Robina Guillaume MD      Clinical Impression:   The primary encounter diagnosis was Laceration of forehead, initial encounter. Diagnoses of Acute right hip pain, Fall, and Elevated liver enzymes were also pertinent to this visit.      Disposition:   Disposition: Discharged  Condition: Stable       I, Robina Guillaume,  personally performed the services described in this documentation. All medical record entries made by the scribe were at my direction and in my presence.  I have reviewed the chart and agree that the record reflects my personal performance and is accurate and complete. Robina Guillaume M.D. 8:54 AM06/12/2018                   Robina Guillaume MD  06/12/18 0917       Robina Guillaume MD  06/12/18 1131

## 2018-06-12 NOTE — DISCHARGE INSTRUCTIONS
Please call your PCP today to arrange follow up for further management of your elevated liver enzymes and incidental findings on cervical spine CT.  You may need further testing in future such as RUQ abdominal US or bone scan.  Also please decrease your dose of sleeping medicine.  Only take 15 mg of remeron at night.

## 2018-06-18 LAB
PERIPHERAL PTA: YES
PERIPHERAL STENOSIS: ABNORMAL
PERIPHERAL STENT: YES

## 2018-06-20 ENCOUNTER — HOSPITAL ENCOUNTER (OUTPATIENT)
Dept: RADIOLOGY | Facility: HOSPITAL | Age: 58
Discharge: HOME | End: 2018-06-20
Attending: PODIATRIST
Payer: COMMERCIAL

## 2018-06-20 DIAGNOSIS — M79.672 PAIN IN BOTH FEET: ICD-10-CM

## 2018-06-20 DIAGNOSIS — M79.671 PAIN IN BOTH FEET: ICD-10-CM

## 2018-06-20 PROCEDURE — 73630 X-RAY EXAM OF FOOT: CPT | Mod: 50,TC,FY,PO

## 2018-07-24 ENCOUNTER — OFFICE VISIT (OUTPATIENT)
Dept: CARDIOLOGY | Facility: CLINIC | Age: 58
End: 2018-07-24
Payer: MEDICAID

## 2018-07-24 VITALS
SYSTOLIC BLOOD PRESSURE: 161 MMHG | BODY MASS INDEX: 18.88 KG/M2 | WEIGHT: 100 LBS | DIASTOLIC BLOOD PRESSURE: 89 MMHG | HEIGHT: 61 IN | OXYGEN SATURATION: 98 % | HEART RATE: 108 BPM

## 2018-07-24 DIAGNOSIS — R29.898 WEAKNESS OF BOTH LOWER EXTREMITIES: ICD-10-CM

## 2018-07-24 DIAGNOSIS — Z72.0 TOBACCO ABUSE: ICD-10-CM

## 2018-07-24 DIAGNOSIS — N17.9 AKI (ACUTE KIDNEY INJURY): ICD-10-CM

## 2018-07-24 DIAGNOSIS — R06.09 DOE (DYSPNEA ON EXERTION): ICD-10-CM

## 2018-07-24 DIAGNOSIS — I73.9 PAD (PERIPHERAL ARTERY DISEASE): Primary | ICD-10-CM

## 2018-07-24 DIAGNOSIS — I73.9 CLAUDICATION: ICD-10-CM

## 2018-07-24 DIAGNOSIS — J44.9 CHRONIC OBSTRUCTIVE PULMONARY DISEASE, UNSPECIFIED COPD TYPE: ICD-10-CM

## 2018-07-24 DIAGNOSIS — I74.09 CHRONIC AORTO-ILIAC OCCLUSION SYNDROME: ICD-10-CM

## 2018-07-24 PROCEDURE — 99999 PR PBB SHADOW E&M-EST. PATIENT-LVL III: CPT | Mod: PBBFAC,,, | Performed by: INTERNAL MEDICINE

## 2018-07-24 PROCEDURE — 99213 OFFICE O/P EST LOW 20 MIN: CPT | Mod: PBBFAC,PO | Performed by: INTERNAL MEDICINE

## 2018-07-24 PROCEDURE — 99214 OFFICE O/P EST MOD 30 MIN: CPT | Mod: S$PBB,,, | Performed by: INTERNAL MEDICINE

## 2018-09-20 ENCOUNTER — TELEPHONE (OUTPATIENT)
Dept: CARDIOLOGY | Facility: CLINIC | Age: 58
End: 2018-09-20

## 2018-09-20 NOTE — TELEPHONE ENCOUNTER
Contacted pt.    Pt requested to know if our office had given her a call to schedule a f/u dilan. Pt was advised that per last clinical visit note, pt is to f/u around January w/ Dr. Foster.    Pt stated she understood

## 2018-09-20 NOTE — TELEPHONE ENCOUNTER
----- Message from Shannon Wagner sent at 9/20/2018 12:52 PM CDT -----  Contact: [phone]  Patient would like to schedule a 3 month follow up with the doctor. Please call.

## 2018-11-28 ENCOUNTER — TELEPHONE (OUTPATIENT)
Dept: CARDIOLOGY | Facility: CLINIC | Age: 58
End: 2018-11-28

## 2018-11-28 DIAGNOSIS — R26.9 UNSPECIFIED ABNORMALITIES OF GAIT AND MOBILITY: Primary | ICD-10-CM

## 2018-11-28 NOTE — TELEPHONE ENCOUNTER
----- Message from Chaparrita Talley sent at 11/28/2018  2:55 PM CST -----  Contact: self / [phone]  Patient is requesting a call back regarding, she needs Chantix prescription faxed to the below Please advise          Fax number [phone]

## 2018-11-28 NOTE — TELEPHONE ENCOUNTER
Reached out to pt     Pt requested refill on her chantix    Pt was advised that she would have to get in contact w/ her PCP to get that medication ordered     Pt stated she understood

## 2019-01-10 DIAGNOSIS — R26.9 ABNORMALITY OF GAIT: Primary | ICD-10-CM

## 2019-12-13 NOTE — NURSING
Spoke with patient and gave her an arrival time of 7:00am on Friday Feb 23.  All preop instructions given, pt has no questions.    Walk in

## 2020-12-21 NOTE — PROGRESS NOTES
Subjective:    Patient ID:  Min Cerda is a 57 y.o. female who presents for follow-up of Peripheral Arterial Disease      HPI  58 y/o female with hx of PAD suggested by arterial doppler and confirmed with CTA, COPD, active tobacco use, neurogenic claudication, hx of upper and lower back pain, fam hx of early CAD who presents for f/u. Has aorto-iliac occlusive disease repair with successful Endologix endovascular stent graft placement ('s crossed in antegrade and retrograde fashion with serial dilation, 22 x 60 AFX2 bifurcated device with 13 x 40 iliac limb extensions placed and post dilated with 10 x 40 balloon in main body and 8 x 60 and 8 x 40 balloons in iliac limbs, 9 x 40 self expanding stent and 9 x 30 self expanding stent in each EIA's). Post procedure uncomplicated and discharged home in improved condition.   Since last clinic visit has had worsening of back pain and LE paresthesias. Has chronic pain and follows with neurologist. No recurrent vascular claudication. Continues to complain of intermittent bilateral foot numbness, unchanged and back pain. Symptomatically much improved regarding PAD. Has had a nuclear SPECT which was normal. Denies CP, orthopnea, PND, syncope. Attempting to stop smoking but continues with < 1/2 PPD.     Review of Systems   Constitution: Positive for weakness and malaise/fatigue.   HENT: Negative for congestion.    Eyes: Negative for blurred vision.   Cardiovascular: Positive for dyspnea on exertion. Negative for chest pain, claudication, cyanosis, irregular heartbeat, leg swelling, near-syncope, orthopnea, palpitations, paroxysmal nocturnal dyspnea and syncope.   Respiratory: Positive for shortness of breath.    Endocrine: Negative for polyuria.   Hematologic/Lymphatic: Negative for bleeding problem.   Skin: Negative for itching and rash.   Musculoskeletal: Positive for arthritis, back pain, muscle weakness and neck pain. Negative for joint swelling and muscle cramps.    Gastrointestinal: Negative for abdominal pain, hematemesis, hematochezia, melena, nausea and vomiting.   Genitourinary: Negative for dysuria and hematuria.   Neurological: Positive for loss of balance, numbness and paresthesias. Negative for dizziness, focal weakness, headaches and light-headedness.   Psychiatric/Behavioral: Positive for depression. The patient is not nervous/anxious.         Objective:    Physical Exam   Constitutional: She is oriented to person, place, and time. She appears well-developed and well-nourished.   HENT:   Head: Normocephalic and atraumatic.   Neck: Neck supple. No JVD present.   Cardiovascular: Normal rate, regular rhythm and normal heart sounds.    Pulses:       Carotid pulses are 2+ on the right side, and 2+ on the left side.       Radial pulses are 2+ on the right side, and 2+ on the left side.        Femoral pulses are 2+ on the right side, and 2+ on the left side.       Posterior tibial pulses are 2+ on the right side, and 2+ on the left side.   Bilateral biphasic PT per doppler   Pulmonary/Chest: Effort normal and breath sounds normal.   Abdominal: Soft. Bowel sounds are normal.   Musculoskeletal: She exhibits no edema.   Neurological: She is alert and oriented to person, place, and time.   Skin: Skin is warm and dry.   Psychiatric: She has a normal mood and affect. Her behavior is normal. Thought content normal.         Assessment:       1. PAD (peripheral artery disease)    2. Claudication    3. Chronic obstructive pulmonary disease, unspecified COPD type    4. Chronic aorto-iliac occlusion syndrome    5. Weakness of both lower extremities    6. EASLEY (dyspnea on exertion)    7. ERICKSON (acute kidney injury)    8. Tobacco abuse      58 y/o female with hx and presentation as above. She has had worsening of chronic pain and neuropathy. Regarding her PAD, she has been stable. Needs surveillance and will schedule CTA to evaluate endovascular graft and arterial LE doppler.        Plan:        -CTA aorto-iliac  -Arterial bilateral LE doppler  -f/u in 6 months             cut to the middle finger
